# Patient Record
Sex: FEMALE | Race: OTHER | Employment: FULL TIME | URBAN - METROPOLITAN AREA
[De-identification: names, ages, dates, MRNs, and addresses within clinical notes are randomized per-mention and may not be internally consistent; named-entity substitution may affect disease eponyms.]

---

## 2021-02-25 ENCOUNTER — OFFICE VISIT (OUTPATIENT)
Dept: OBGYN CLINIC | Facility: CLINIC | Age: 31
End: 2021-02-25
Payer: COMMERCIAL

## 2021-02-25 VITALS — HEIGHT: 62 IN | BODY MASS INDEX: 38.86 KG/M2 | WEIGHT: 211.19 LBS

## 2021-02-25 DIAGNOSIS — Z01.419 WOMEN'S ANNUAL ROUTINE GYNECOLOGICAL EXAMINATION: Primary | ICD-10-CM

## 2021-02-25 DIAGNOSIS — Z12.4 ROUTINE CERVICAL SMEAR: ICD-10-CM

## 2021-02-25 DIAGNOSIS — Z11.3 SCREENING EXAMINATION FOR VENEREAL DISEASE: ICD-10-CM

## 2021-02-25 DIAGNOSIS — Z30.432 ENCOUNTER FOR REMOVAL OF INTRAUTERINE CONTRACEPTIVE DEVICE: ICD-10-CM

## 2021-02-25 DIAGNOSIS — Z31.69 PRE-CONCEPTION COUNSELING: ICD-10-CM

## 2021-02-25 PROCEDURE — 88175 CYTOPATH C/V AUTO FLUID REDO: CPT | Performed by: OBSTETRICS & GYNECOLOGY

## 2021-02-25 PROCEDURE — 3008F BODY MASS INDEX DOCD: CPT | Performed by: OBSTETRICS & GYNECOLOGY

## 2021-02-25 PROCEDURE — 87106 FUNGI IDENTIFICATION YEAST: CPT | Performed by: OBSTETRICS & GYNECOLOGY

## 2021-02-25 PROCEDURE — 87491 CHLMYD TRACH DNA AMP PROBE: CPT | Performed by: OBSTETRICS & GYNECOLOGY

## 2021-02-25 PROCEDURE — 87205 SMEAR GRAM STAIN: CPT | Performed by: OBSTETRICS & GYNECOLOGY

## 2021-02-25 PROCEDURE — 87591 N.GONORRHOEAE DNA AMP PROB: CPT | Performed by: OBSTETRICS & GYNECOLOGY

## 2021-02-25 PROCEDURE — 58301 REMOVE INTRAUTERINE DEVICE: CPT | Performed by: OBSTETRICS & GYNECOLOGY

## 2021-02-25 PROCEDURE — 99385 PREV VISIT NEW AGE 18-39: CPT | Performed by: OBSTETRICS & GYNECOLOGY

## 2021-02-25 PROCEDURE — 99204 OFFICE O/P NEW MOD 45 MIN: CPT | Performed by: OBSTETRICS & GYNECOLOGY

## 2021-02-25 PROCEDURE — 87808 TRICHOMONAS ASSAY W/OPTIC: CPT | Performed by: OBSTETRICS & GYNECOLOGY

## 2021-02-25 NOTE — PROGRESS NOTES
NEW GYN H&P     2021  8:48 AM    Patient presents with:  New Patient: Annual exam and pap smear.  LMP 2021  Other: family planning   IUD: Paragard IUD removal   .    HPI: Patient is a 35year old  here to establish care - due for annual ex file      ROS:     Review of Systems:  General: no complaints  Eyes: no complaints  Respiratory: no complaints  Cardiovascular: no complaints  GI: denies abdominal pain, diarrhea, constipation  : no complaints, denies dysuria, increased urinary frequency cultures today    2. Pre-conception counseling  - Reviewed timed intercourse and ovulation prediction  - Begin PNV    3.  Encounter for removal of intrauterine contraceptive device  - REMOVE INTRAUTERINE DEVICE      Total time spent = 45 minutes  >50% visit

## 2021-02-26 ENCOUNTER — MED REC SCAN ONLY (OUTPATIENT)
Dept: OBGYN CLINIC | Facility: CLINIC | Age: 31
End: 2021-02-26

## 2021-02-26 LAB
C TRACH DNA SPEC QL NAA+PROBE: NEGATIVE
N GONORRHOEA DNA SPEC QL NAA+PROBE: NEGATIVE

## 2021-02-27 LAB
GENITAL VAGINOSIS SCREEN: NEGATIVE
TRICHOMONAS SCREEN: NEGATIVE

## 2021-02-27 RX ORDER — FLUCONAZOLE 150 MG/1
150 TABLET ORAL DAILY
Qty: 2 TABLET | Refills: 0 | Status: SHIPPED | OUTPATIENT
Start: 2021-02-27 | End: 2021-03-01

## 2021-03-04 NOTE — PROGRESS NOTES
Released to [employer] and results viewed by pt. fluconazole (DIFLUCAN) 150 MG Oral Tab 2 tablet 0 2/27/2021 3/1/2021   Sig - Route: Take 1 tablet (150 mg total) by mouth daily for 2 doses.  Take one dose then repeat in 72 hours - Oral   Sent to pharmacy as:

## 2021-07-08 ENCOUNTER — OFFICE VISIT (OUTPATIENT)
Dept: OBGYN CLINIC | Facility: CLINIC | Age: 31
End: 2021-07-08
Payer: COMMERCIAL

## 2021-07-08 ENCOUNTER — TELEPHONE (OUTPATIENT)
Dept: OBGYN CLINIC | Facility: CLINIC | Age: 31
End: 2021-07-08

## 2021-07-08 VITALS
DIASTOLIC BLOOD PRESSURE: 86 MMHG | BODY MASS INDEX: 39.75 KG/M2 | SYSTOLIC BLOOD PRESSURE: 128 MMHG | WEIGHT: 216 LBS | HEIGHT: 62 IN

## 2021-07-08 DIAGNOSIS — Z32.00 ENCOUNTER FOR CONFIRMATION OF PREGNANCY TEST RESULT WITH PHYSICAL EXAMINATION: Primary | ICD-10-CM

## 2021-07-08 PROBLEM — Z31.69 PRE-CONCEPTION COUNSELING: Status: RESOLVED | Noted: 2021-02-25 | Resolved: 2021-07-08

## 2021-07-08 PROBLEM — E66.9 OBESITY: Status: ACTIVE | Noted: 2018-07-23

## 2021-07-08 PROBLEM — Z01.419 WOMEN'S ANNUAL ROUTINE GYNECOLOGICAL EXAMINATION: Status: RESOLVED | Noted: 2021-02-25 | Resolved: 2021-07-08

## 2021-07-08 PROBLEM — Z30.432 ENCOUNTER FOR REMOVAL OF INTRAUTERINE CONTRACEPTIVE DEVICE: Status: RESOLVED | Noted: 2021-02-25 | Resolved: 2021-07-08

## 2021-07-08 LAB
CONTROL LINE PRESENT WITH A CLEAR BACKGROUND (YES/NO): YES YES/NO
KIT EXPIRATION DATE: NORMAL DATE
PREGNANCY TEST, URINE: POSITIVE

## 2021-07-08 PROCEDURE — 3008F BODY MASS INDEX DOCD: CPT | Performed by: OBSTETRICS & GYNECOLOGY

## 2021-07-08 PROCEDURE — 3079F DIAST BP 80-89 MM HG: CPT | Performed by: OBSTETRICS & GYNECOLOGY

## 2021-07-08 PROCEDURE — 99215 OFFICE O/P EST HI 40 MIN: CPT | Performed by: OBSTETRICS & GYNECOLOGY

## 2021-07-08 PROCEDURE — 81025 URINE PREGNANCY TEST: CPT | Performed by: OBSTETRICS & GYNECOLOGY

## 2021-07-08 PROCEDURE — 3074F SYST BP LT 130 MM HG: CPT | Performed by: OBSTETRICS & GYNECOLOGY

## 2021-07-08 NOTE — PROGRESS NOTES
OFFICE VISIT FOR CONFIRMATION OF PREGNANCY    2021  6:18 PM    CC: Patient is here for confirmation of pregnancy. EGA 5 W    HPI: Patient is a 28year old  Patient's last menstrual period was 2021.  Unknown Estimated Date of Delivery: None 2018    Other Topics      Concerns:         Service: Not Asked        Blood Transfusions: No        Caffeine Concern: Not Asked        Occupational Exposure: Not Asked        Hobby Hazards: Not Asked        Sleep Concern: Not Asked        Stress Co balanced diet with emphasis on iron rich and food high in calcium. I also recommended calcium and vitamin D supplementation, as well as DHA/omega3 fatty acids.   2.) Appropriate weight gain in pregnancy (20 - 30 lbs if normal weight, and 10 - 15 lbs if over

## 2021-07-08 NOTE — TELEPHONE ENCOUNTER
Pt has an appt for August 9th for confirmation of pregnancy but would like to see about getting in sooner.

## 2021-07-08 NOTE — TELEPHONE ENCOUNTER
Informed PSR Francia of appointment slot with [facility] available today, to call pt and provide that appointment and done.

## 2021-08-02 ENCOUNTER — TELEPHONE (OUTPATIENT)
Dept: OBGYN CLINIC | Facility: CLINIC | Age: 31
End: 2021-08-02

## 2021-08-02 ENCOUNTER — ULTRASOUND ENCOUNTER (OUTPATIENT)
Dept: OBGYN CLINIC | Facility: CLINIC | Age: 31
End: 2021-08-02
Payer: COMMERCIAL

## 2021-08-02 DIAGNOSIS — Z32.00 ENCOUNTER FOR CONFIRMATION OF PREGNANCY TEST RESULT WITH PHYSICAL EXAMINATION: ICD-10-CM

## 2021-08-02 PROCEDURE — 76817 TRANSVAGINAL US OBSTETRIC: CPT | Performed by: OBSTETRICS & GYNECOLOGY

## 2021-08-02 NOTE — TELEPHONE ENCOUNTER
Patient seen at [facility] on Sunday with spotting and cramping. Patient has continued to spot and was told to call her physician to be seen.

## 2021-08-02 NOTE — TELEPHONE ENCOUNTER
Pt contacted. Pt reports still spotting bright red blood. Pt states [facility] ED did HCG level, not available through care everywhere, pt read hcg lab value over phone \" [phone]\".  Pt states she had an ultrasound in the ed yesterday as well an Intrauterine p

## 2021-08-04 ENCOUNTER — TELEPHONE (OUTPATIENT)
Dept: OBGYN CLINIC | Facility: CLINIC | Age: 31
End: 2021-08-04

## 2021-08-04 DIAGNOSIS — O41.8X10 SUBCHORIONIC HEMATOMA IN FIRST TRIMESTER, SINGLE OR UNSPECIFIED FETUS: Primary | ICD-10-CM

## 2021-08-04 DIAGNOSIS — O46.8X1 SUBCHORIONIC HEMATOMA IN FIRST TRIMESTER, SINGLE OR UNSPECIFIED FETUS: Primary | ICD-10-CM

## 2021-08-04 NOTE — TELEPHONE ENCOUNTER
RN reviewed ultrasound with EB. EB okay with pt scheduling RN OB edu visit, NOB, repeat ultrasound in 4 weeks. RN contacted pt, informed her of POC. Pt verbalized understanding, agrees.      Pt scheduled for:    RN OB Edu 8/10/21  NOB: 9/1/21 with GEOFF day

## 2021-08-04 NOTE — TELEPHONE ENCOUNTER
RN contacted pt. RN informed pt that ultrasound report has been printed and will discuss with EB when she arrives. Pt reports still spotting bright red to brown, pt states it is every time she wipes. Pt denies pain, reports occasional light cramping.

## 2021-08-10 ENCOUNTER — NURSE ONLY (OUTPATIENT)
Dept: OBGYN CLINIC | Facility: CLINIC | Age: 31
End: 2021-08-10
Payer: COMMERCIAL

## 2021-08-10 DIAGNOSIS — Z34.81 ENCOUNTER FOR SUPERVISION OF OTHER NORMAL PREGNANCY IN FIRST TRIMESTER: Primary | ICD-10-CM

## 2021-08-10 DIAGNOSIS — E66.9 OBESITY (BMI 30-39.9): ICD-10-CM

## 2021-08-10 RX ORDER — PRENATAL VIT/IRON FUM/FOLIC AC 27MG-0.8MG
1 TABLET ORAL DAILY
COMMUNITY

## 2021-08-10 NOTE — PROGRESS NOTES
Pt on phone today for RN [facility] Education.     Pregnancy confirmation visit with [facility], primary Dr. Tamra Altamirano  LMP: 21    Pre  BMI:  39.31  EPDS score: 1  +UPT in office: 21    US:21 8 weeks  Working CAYDEN:2022  Hx of genetic abnormality

## 2021-08-16 ENCOUNTER — LAB ENCOUNTER (OUTPATIENT)
Dept: LAB | Age: 31
End: 2021-08-16
Attending: OBSTETRICS & GYNECOLOGY
Payer: COMMERCIAL

## 2021-08-16 ENCOUNTER — PATIENT MESSAGE (OUTPATIENT)
Dept: OBGYN CLINIC | Facility: CLINIC | Age: 31
End: 2021-08-16

## 2021-08-16 ENCOUNTER — TELEPHONE (OUTPATIENT)
Dept: OBGYN CLINIC | Facility: CLINIC | Age: 31
End: 2021-08-16

## 2021-08-16 DIAGNOSIS — E66.9 OBESITY (BMI 30-39.9): ICD-10-CM

## 2021-08-16 DIAGNOSIS — Z34.81 ENCOUNTER FOR SUPERVISION OF OTHER NORMAL PREGNANCY IN FIRST TRIMESTER: ICD-10-CM

## 2021-08-16 DIAGNOSIS — O24.419 GESTATIONAL DIABETES MELLITUS (GDM) IN SECOND TRIMESTER, GESTATIONAL DIABETES METHOD OF CONTROL UNSPECIFIED: Primary | ICD-10-CM

## 2021-08-16 LAB
ANTIBODY SCREEN: NEGATIVE
BASOPHILS # BLD AUTO: 0.04 X10(3) UL (ref 0–0.2)
BASOPHILS NFR BLD AUTO: 0.4 %
DEPRECATED RDW RBC AUTO: 41.1 FL (ref 35.1–46.3)
EOSINOPHIL # BLD AUTO: 0.13 X10(3) UL (ref 0–0.7)
EOSINOPHIL NFR BLD AUTO: 1.3 %
ERYTHROCYTE [DISTWIDTH] IN BLOOD BY AUTOMATED COUNT: 13.1 % (ref 11–15)
GLUCOSE 1H P GLC SERPL-MCNC: 144 MG/DL
HBV SURFACE AG SER-ACNC: 0.22 [IU]/L
HBV SURFACE AG SERPL QL IA: NONREACTIVE
HCT VFR BLD AUTO: 37.3 %
HGB BLD-MCNC: 12.3 G/DL
IMM GRANULOCYTES # BLD AUTO: 0.04 X10(3) UL (ref 0–1)
IMM GRANULOCYTES NFR BLD: 0.4 %
LYMPHOCYTES # BLD AUTO: 2.16 X10(3) UL (ref 1–4)
LYMPHOCYTES NFR BLD AUTO: 20.9 %
MCH RBC QN AUTO: 28.5 PG (ref 26–34)
MCHC RBC AUTO-ENTMCNC: 33 G/DL (ref 31–37)
MCV RBC AUTO: 86.3 FL
MONOCYTES # BLD AUTO: 0.43 X10(3) UL (ref 0.1–1)
MONOCYTES NFR BLD AUTO: 4.2 %
NEUTROPHILS # BLD AUTO: 7.52 X10 (3) UL (ref 1.5–7.7)
NEUTROPHILS # BLD AUTO: 7.52 X10(3) UL (ref 1.5–7.7)
NEUTROPHILS NFR BLD AUTO: 72.8 %
PLATELET # BLD AUTO: 355 10(3)UL (ref 150–450)
RBC # BLD AUTO: 4.32 X10(6)UL
RH BLOOD TYPE: POSITIVE
RUBV IGG SER QL: POSITIVE
RUBV IGG SER-ACNC: 82 IU/ML (ref 10–?)
TSI SER-ACNC: 1.79 MIU/ML (ref 0.36–3.74)
WBC # BLD AUTO: 10.3 X10(3) UL (ref 4–11)

## 2021-08-16 PROCEDURE — 87086 URINE CULTURE/COLONY COUNT: CPT

## 2021-08-16 PROCEDURE — 86762 RUBELLA ANTIBODY: CPT

## 2021-08-16 PROCEDURE — 82950 GLUCOSE TEST: CPT

## 2021-08-16 PROCEDURE — 86780 TREPONEMA PALLIDUM: CPT

## 2021-08-16 PROCEDURE — 86900 BLOOD TYPING SEROLOGIC ABO: CPT

## 2021-08-16 PROCEDURE — 85025 COMPLETE CBC W/AUTO DIFF WBC: CPT

## 2021-08-16 PROCEDURE — 86850 RBC ANTIBODY SCREEN: CPT

## 2021-08-16 PROCEDURE — 87340 HEPATITIS B SURFACE AG IA: CPT

## 2021-08-16 PROCEDURE — 86901 BLOOD TYPING SEROLOGIC RH(D): CPT

## 2021-08-16 PROCEDURE — 36415 COLL VENOUS BLD VENIPUNCTURE: CPT

## 2021-08-16 PROCEDURE — 84443 ASSAY THYROID STIM HORMONE: CPT

## 2021-08-16 PROCEDURE — 87077 CULTURE AEROBIC IDENTIFY: CPT

## 2021-08-16 PROCEDURE — 87389 HIV-1 AG W/HIV-1&-2 AB AG IA: CPT

## 2021-08-16 NOTE — TELEPHONE ENCOUNTER
Please see 1st note which states, pt informed of diabetic referral with diabetic educators. Called pt and provided information on GDM, diet control. Pt verbalized understanding and agrees with POC.

## 2021-08-16 NOTE — TELEPHONE ENCOUNTER
Sachi Sebastian MD   8/16/2021  2:01 PM CDT      Your diabetes screen is positive. Please schedule an appointment with one of our diabetic educators.     The referral for diabetic education has been placed.   Someone should be contacting you soon

## 2021-08-17 NOTE — PROGRESS NOTES
Noe Piper RN    AD    8/16/21 3:23 PM  Note      Called pt and provided information on GDM, diet control. Pt verbalized understanding and agrees with POC.

## 2021-08-18 LAB — T PALLIDUM AB SER QL: NEGATIVE

## 2021-08-19 ENCOUNTER — TELEPHONE (OUTPATIENT)
Dept: OBGYN CLINIC | Facility: CLINIC | Age: 31
End: 2021-08-19

## 2021-08-19 RX ORDER — AMOXICILLIN 500 MG/1
500 CAPSULE ORAL 3 TIMES DAILY
Qty: 9 CAPSULE | Refills: 0 | Status: SHIPPED | OUTPATIENT
Start: 2021-08-19 | End: 2021-08-22

## 2021-08-19 NOTE — TELEPHONE ENCOUNTER
----- Message from Donald Arellano MD sent at 8/18/2021  1:06 PM CDT -----  Your lab results show UTI.   Please take Amoxicillin 500 mg three times daily x 3d      Es Munoz MD    Spoke with pt, results of urine culture given and explained need for

## 2021-08-19 NOTE — PROGRESS NOTES
Spoke with pt, results of urine culture given and explained need for treatment with above meds. Pt states NKDA and pharmacy verified. [address] sent to pharmacy.

## 2021-08-27 ENCOUNTER — TELEPHONE (OUTPATIENT)
Dept: OBGYN CLINIC | Facility: CLINIC | Age: 31
End: 2021-08-27

## 2021-08-27 NOTE — TELEPHONE ENCOUNTER
Per Dr. Destiny Coley ok to call pt with Negative prequel/female fetus results.     LM on pt's voicemail to call back for results. [employer] message sent to pt.

## 2021-08-27 NOTE — TELEPHONE ENCOUNTER
Pt called back, 2 patient identifiers verified by patient. Spoke to pt normal results given/explained pt voiced understanding. Fetal sex results NOT given to pt. Pt would like to  gender results at next office visit with Dr. Cathy Clark.

## 2021-08-28 ENCOUNTER — APPOINTMENT (OUTPATIENT)
Dept: ENDOCRINOLOGY | Facility: HOSPITAL | Age: 31
End: 2021-08-28
Attending: OBSTETRICS & GYNECOLOGY
Payer: COMMERCIAL

## 2021-09-01 ENCOUNTER — INITIAL PRENATAL (OUTPATIENT)
Dept: OBGYN CLINIC | Facility: CLINIC | Age: 31
End: 2021-09-01
Payer: COMMERCIAL

## 2021-09-01 ENCOUNTER — ULTRASOUND ENCOUNTER (OUTPATIENT)
Dept: OBGYN CLINIC | Facility: CLINIC | Age: 31
End: 2021-09-01
Payer: COMMERCIAL

## 2021-09-01 VITALS
SYSTOLIC BLOOD PRESSURE: 118 MMHG | BODY MASS INDEX: 40.12 KG/M2 | DIASTOLIC BLOOD PRESSURE: 74 MMHG | WEIGHT: 218 LBS | HEIGHT: 62 IN

## 2021-09-01 DIAGNOSIS — O46.8X1 SUBCHORIONIC HEMATOMA IN FIRST TRIMESTER, SINGLE OR UNSPECIFIED FETUS: ICD-10-CM

## 2021-09-01 DIAGNOSIS — O41.8X10 SUBCHORIONIC HEMATOMA IN FIRST TRIMESTER, SINGLE OR UNSPECIFIED FETUS: ICD-10-CM

## 2021-09-01 DIAGNOSIS — Z34.81 MULTIGRAVIDA IN FIRST TRIMESTER: Primary | ICD-10-CM

## 2021-09-01 DIAGNOSIS — O24.319 MATERNAL PREGESTATIONAL DIABETES CLASSES B THROUGH R, ANTEPARTUM: ICD-10-CM

## 2021-09-01 PROCEDURE — 3078F DIAST BP <80 MM HG: CPT | Performed by: OBSTETRICS & GYNECOLOGY

## 2021-09-01 PROCEDURE — 3074F SYST BP LT 130 MM HG: CPT | Performed by: OBSTETRICS & GYNECOLOGY

## 2021-09-01 PROCEDURE — 3008F BODY MASS INDEX DOCD: CPT | Performed by: OBSTETRICS & GYNECOLOGY

## 2021-09-01 PROCEDURE — 76817 TRANSVAGINAL US OBSTETRIC: CPT | Performed by: OBSTETRICS & GYNECOLOGY

## 2021-09-01 RX ORDER — LORATADINE 10 MG/1
10 TABLET ORAL DAILY
COMMUNITY
End: 2021-12-31

## 2021-09-01 NOTE — PROGRESS NOTES
Here for NOB visit after USN today confirms SIUP at 13.1w with CAYDEN 3/8/2022 and small [facility] without any further bleeding. Aware of normal CFDNA results, gender in envelope today.  Has GDM teaching appointment next week -- currently controlling sugar and porti

## 2021-09-08 ENCOUNTER — APPOINTMENT (OUTPATIENT)
Dept: ENDOCRINOLOGY | Facility: HOSPITAL | Age: 31
End: 2021-09-08
Attending: OBSTETRICS & GYNECOLOGY
Payer: COMMERCIAL

## 2021-09-13 ENCOUNTER — TELEPHONE (OUTPATIENT)
Dept: OBGYN CLINIC | Facility: CLINIC | Age: 31
End: 2021-09-13

## 2021-09-13 NOTE — TELEPHONE ENCOUNTER
RN contacted pt. Pt reports visiting ED 9/11/21 for vaginal bleeding and reports passing golf-ball size clot. Pt states that ultrasound in ED showed a low-lying placenta.  Pt denies any further vaginal bleeding, states cramping has improved greatly since ED

## 2021-09-14 ENCOUNTER — ROUTINE PRENATAL (OUTPATIENT)
Dept: OBGYN CLINIC | Facility: CLINIC | Age: 31
End: 2021-09-14
Payer: COMMERCIAL

## 2021-09-14 VITALS
DIASTOLIC BLOOD PRESSURE: 60 MMHG | BODY MASS INDEX: 40.03 KG/M2 | HEIGHT: 62 IN | WEIGHT: 217.5 LBS | SYSTOLIC BLOOD PRESSURE: 118 MMHG

## 2021-09-14 DIAGNOSIS — Z36.9 UNSPECIFIED ANTENATAL SCREENING: Primary | ICD-10-CM

## 2021-09-14 DIAGNOSIS — O44.42 LOW-LYING PLACENTA IN SECOND TRIMESTER: ICD-10-CM

## 2021-09-14 DIAGNOSIS — O46.8X2 SUBCHORIONIC HEMATOMA IN SECOND TRIMESTER, SINGLE OR UNSPECIFIED FETUS: ICD-10-CM

## 2021-09-14 DIAGNOSIS — O46.92 SECOND TRIMESTER BLEEDING: ICD-10-CM

## 2021-09-14 DIAGNOSIS — O41.8X20 SUBCHORIONIC HEMATOMA IN SECOND TRIMESTER, SINGLE OR UNSPECIFIED FETUS: ICD-10-CM

## 2021-09-14 LAB
GLUCOSE (URINE DIPSTICK): NEGATIVE MG/DL
MULTISTIX LOT#: NORMAL NUMERIC
PROTEIN (URINE DIPSTICK): NEGATIVE MG/DL

## 2021-09-14 PROCEDURE — 3008F BODY MASS INDEX DOCD: CPT | Performed by: OBSTETRICS & GYNECOLOGY

## 2021-09-14 PROCEDURE — 3074F SYST BP LT 130 MM HG: CPT | Performed by: OBSTETRICS & GYNECOLOGY

## 2021-09-14 PROCEDURE — 3078F DIAST BP <80 MM HG: CPT | Performed by: OBSTETRICS & GYNECOLOGY

## 2021-09-14 PROCEDURE — 81002 URINALYSIS NONAUTO W/O SCOPE: CPT | Performed by: OBSTETRICS & GYNECOLOGY

## 2021-09-14 NOTE — PROGRESS NOTES
Seen 9/11/21 in [address] [place] [address] ER for cramping and bleeding with USN showing low-lying vs marginal placenta previa. Reports no further bleeding until light spotting this morning on way in to appointment although no cramps.  Prior USN here 09/1/21 at 13w showed small

## 2021-09-16 ENCOUNTER — ULTRASOUND ENCOUNTER (OUTPATIENT)
Dept: OBGYN CLINIC | Facility: CLINIC | Age: 31
End: 2021-09-16
Payer: COMMERCIAL

## 2021-09-16 DIAGNOSIS — O26.899 CRAMPING AFFECTING PREGNANCY, ANTEPARTUM: ICD-10-CM

## 2021-09-16 DIAGNOSIS — R10.9 CRAMPING AFFECTING PREGNANCY, ANTEPARTUM: ICD-10-CM

## 2021-09-16 DIAGNOSIS — O46.90 VAGINAL BLEEDING DURING PREGNANCY: Primary | ICD-10-CM

## 2021-09-16 DIAGNOSIS — O46.90 VAGINAL BLEEDING DURING PREGNANCY: ICD-10-CM

## 2021-09-16 PROCEDURE — 76815 OB US LIMITED FETUS(S): CPT | Performed by: OBSTETRICS & GYNECOLOGY

## 2021-09-18 ENCOUNTER — HOSPITAL ENCOUNTER (OUTPATIENT)
Dept: ENDOCRINOLOGY | Facility: HOSPITAL | Age: 31
Discharge: HOME | End: 2021-09-18
Attending: OBSTETRICS & GYNECOLOGY
Payer: COMMERCIAL

## 2021-09-18 DIAGNOSIS — O24.419 GESTATIONAL DIABETES MELLITUS (GDM) IN SECOND TRIMESTER, GESTATIONAL DIABETES METHOD OF CONTROL UNSPECIFIED: Primary | ICD-10-CM

## 2021-09-18 DIAGNOSIS — O24.410 DIET CONTROLLED GESTATIONAL DIABETES MELLITUS (GDM) IN FIRST TRIMESTER: Primary | ICD-10-CM

## 2021-09-18 RX ORDER — LANCETS
EACH MISCELLANEOUS
Qty: 100 EACH | Refills: 5 | Status: SHIPPED | OUTPATIENT
Start: 2021-09-18 | End: 2021-11-02

## 2021-09-18 RX ORDER — BLOOD-GLUCOSE METER
1 EACH MISCELLANEOUS DAILY
Qty: 1 KIT | Refills: 0 | Status: SHIPPED | OUTPATIENT
Start: 2021-09-18 | End: 2022-09-18

## 2021-09-18 RX ORDER — BLOOD SUGAR DIAGNOSTIC
1 STRIP MISCELLANEOUS 4 TIMES DAILY
Qty: 100 STRIP | Refills: 5 | Status: SHIPPED | OUTPATIENT
Start: 2021-09-18 | End: 2022-01-16

## 2021-09-20 ENCOUNTER — TELEPHONE (OUTPATIENT)
Dept: OBGYN CLINIC | Facility: CLINIC | Age: 31
End: 2021-09-20

## 2021-09-20 NOTE — TELEPHONE ENCOUNTER
Pt wanting to know results of ultrasound done on 9/16/21 for f/u of [address]. Name: Lashell Pelletier Patient ID: [id number]    Comment  Please note normal fetal growth, see above--No gross abnormalities are visualized, that is, within limitations of scan  9-16+ weeks' gestation. A 1.6cm x 1.0cm subchorionic bleed like area is again noted adjacent to the gestational sac (was 1.4cm x 0.4cm). RN to route to EB.

## 2021-09-21 ENCOUNTER — APPOINTMENT (OUTPATIENT)
Dept: ENDOCRINOLOGY | Facility: HOSPITAL | Age: 31
End: 2021-09-21
Attending: OBSTETRICS & GYNECOLOGY
Payer: COMMERCIAL

## 2021-09-22 ENCOUNTER — TELEPHONE (OUTPATIENT)
Dept: OBGYN CLINIC | Facility: CLINIC | Age: 31
End: 2021-09-22

## 2021-09-22 NOTE — TELEPHONE ENCOUNTER
LM on VM that USN showed minimal change in size of  [facility]. To track any spotting and to keep scheduled ANDRIA appointment with me.

## 2021-09-27 ENCOUNTER — TELEPHONE (OUTPATIENT)
Dept: OBGYN CLINIC | Facility: CLINIC | Age: 31
End: 2021-09-27

## 2021-09-27 DIAGNOSIS — O24.419 GESTATIONAL DIABETES MELLITUS (GDM) IN SECOND TRIMESTER, GESTATIONAL DIABETES METHOD OF CONTROL UNSPECIFIED: Primary | ICD-10-CM

## 2021-09-27 RX ORDER — LANCETS
EACH MISCELLANEOUS
Qty: 200 EACH | Refills: 3 | Status: SHIPPED | OUTPATIENT
Start: 2021-09-27

## 2021-09-27 RX ORDER — BLOOD SUGAR DIAGNOSTIC
STRIP MISCELLANEOUS
Qty: 300 STRIP | Refills: 1 | Status: SHIPPED | OUTPATIENT
Start: 2021-09-27 | End: 2021-11-02

## 2021-09-27 RX ORDER — BLOOD-GLUCOSE METER
EACH MISCELLANEOUS
Qty: 1 KIT | Refills: 0 | Status: SHIPPED | OUTPATIENT
Start: 2021-09-27 | End: 2021-11-02

## 2021-09-27 NOTE — TELEPHONE ENCOUNTER
RN provided info of pt's denial from [facility] for blood glucose monitoring. RN spoke to pharmacy who informed this RN that blood glucose kit that is covered for pt is the accu chck guide. RN sent new prescriptions and notified pt.          Pt agrees

## 2021-09-30 ENCOUNTER — ROUTINE PRENATAL (OUTPATIENT)
Dept: OBGYN CLINIC | Facility: CLINIC | Age: 31
End: 2021-09-30
Payer: COMMERCIAL

## 2021-09-30 ENCOUNTER — LABORATORY ENCOUNTER (OUTPATIENT)
Dept: LAB | Facility: REFERENCE LAB | Age: 31
End: 2021-09-30
Attending: OBSTETRICS & GYNECOLOGY
Payer: COMMERCIAL

## 2021-09-30 ENCOUNTER — MED REC SCAN ONLY (OUTPATIENT)
Dept: OBGYN CLINIC | Facility: CLINIC | Age: 31
End: 2021-09-30

## 2021-09-30 VITALS
SYSTOLIC BLOOD PRESSURE: 122 MMHG | BODY MASS INDEX: 39.93 KG/M2 | HEIGHT: 62 IN | WEIGHT: 217 LBS | DIASTOLIC BLOOD PRESSURE: 76 MMHG

## 2021-09-30 DIAGNOSIS — O41.8X20 SUBCHORIONIC HEMATOMA IN SECOND TRIMESTER, SINGLE OR UNSPECIFIED FETUS: ICD-10-CM

## 2021-09-30 DIAGNOSIS — Z36.9 UNSPECIFIED ANTENATAL SCREENING: ICD-10-CM

## 2021-09-30 DIAGNOSIS — Z34.82 MULTIGRAVIDA IN SECOND TRIMESTER: ICD-10-CM

## 2021-09-30 DIAGNOSIS — Z36.9 UNSPECIFIED ANTENATAL SCREENING: Primary | ICD-10-CM

## 2021-09-30 DIAGNOSIS — O46.8X2 SUBCHORIONIC HEMATOMA IN SECOND TRIMESTER, SINGLE OR UNSPECIFIED FETUS: ICD-10-CM

## 2021-09-30 DIAGNOSIS — O24.410 DIET CONTROLLED GESTATIONAL DIABETES MELLITUS (GDM) IN FIRST TRIMESTER: ICD-10-CM

## 2021-09-30 PROBLEM — O24.319 MATERNAL PREGESTATIONAL DIABETES CLASSES B THROUGH R, ANTEPARTUM (HCC): Status: RESOLVED | Noted: 2021-09-01 | Resolved: 2021-09-30

## 2021-09-30 PROBLEM — O44.42 LOW-LYING PLACENTA IN SECOND TRIMESTER: Status: RESOLVED | Noted: 2021-09-14 | Resolved: 2021-09-30

## 2021-09-30 PROBLEM — O24.319 MATERNAL PREGESTATIONAL DIABETES CLASSES B THROUGH R, ANTEPARTUM: Status: RESOLVED | Noted: 2021-09-01 | Resolved: 2021-09-30

## 2021-09-30 PROBLEM — O44.42 LOW-LYING PLACENTA IN SECOND TRIMESTER (HCC): Status: RESOLVED | Noted: 2021-09-14 | Resolved: 2021-09-30

## 2021-09-30 LAB
GLUCOSE (URINE DIPSTICK): NEGATIVE MG/DL
MULTISTIX EXPIRATION DATE: NORMAL DATE
MULTISTIX LOT#: NORMAL NUMERIC
PROTEIN (URINE DIPSTICK): NEGATIVE MG/DL

## 2021-09-30 PROCEDURE — 3078F DIAST BP <80 MM HG: CPT | Performed by: OBSTETRICS & GYNECOLOGY

## 2021-09-30 PROCEDURE — 36415 COLL VENOUS BLD VENIPUNCTURE: CPT

## 2021-09-30 PROCEDURE — 81002 URINALYSIS NONAUTO W/O SCOPE: CPT | Performed by: OBSTETRICS & GYNECOLOGY

## 2021-09-30 PROCEDURE — 82105 ALPHA-FETOPROTEIN SERUM: CPT

## 2021-09-30 PROCEDURE — 3074F SYST BP LT 130 MM HG: CPT | Performed by: OBSTETRICS & GYNECOLOGY

## 2021-09-30 PROCEDURE — 3008F BODY MASS INDEX DOCD: CPT | Performed by: OBSTETRICS & GYNECOLOGY

## 2021-09-30 NOTE — PROGRESS NOTES
Seen for GDM diet and teaching 9/18/21 - still awaiting supplies approval from insurance, should be able to begin Accuchecks next week. Stressed compliance with diet. For AFP today and 20w USN next visit. Plans on COVID vaccine this week as well.

## 2021-10-16 ENCOUNTER — APPOINTMENT (OUTPATIENT)
Dept: ENDOCRINOLOGY | Facility: HOSPITAL | Age: 31
End: 2021-10-16
Attending: OBSTETRICS & GYNECOLOGY
Payer: COMMERCIAL

## 2021-10-27 ENCOUNTER — ULTRASOUND ENCOUNTER (OUTPATIENT)
Dept: OBGYN CLINIC | Facility: CLINIC | Age: 31
End: 2021-10-27
Payer: COMMERCIAL

## 2021-10-27 DIAGNOSIS — O46.8X2 SUBCHORIONIC HEMATOMA IN SECOND TRIMESTER, SINGLE OR UNSPECIFIED FETUS: ICD-10-CM

## 2021-10-27 DIAGNOSIS — O41.8X20 SUBCHORIONIC HEMATOMA IN SECOND TRIMESTER, SINGLE OR UNSPECIFIED FETUS: ICD-10-CM

## 2021-10-27 DIAGNOSIS — Z34.82 MULTIGRAVIDA IN SECOND TRIMESTER: ICD-10-CM

## 2021-10-27 DIAGNOSIS — O24.410 DIET CONTROLLED GESTATIONAL DIABETES MELLITUS (GDM) IN FIRST TRIMESTER: ICD-10-CM

## 2021-10-27 PROCEDURE — 76805 OB US >/= 14 WKS SNGL FETUS: CPT | Performed by: OBSTETRICS & GYNECOLOGY

## 2021-10-27 PROCEDURE — 76817 TRANSVAGINAL US OBSTETRIC: CPT | Performed by: OBSTETRICS & GYNECOLOGY

## 2021-10-28 ENCOUNTER — TELEPHONE (OUTPATIENT)
Dept: OBGYN CLINIC | Facility: CLINIC | Age: 31
End: 2021-10-28

## 2021-10-28 NOTE — TELEPHONE ENCOUNTER
Called pt and c/o congestion, ear ache, advise d/t pregnancy and use Vicks, NETTLE pot, Claritin, Nasal Spray, and/or Theraflu cold. Advise NO medications with pseudoephedrine and monitor Tylenol intake. Pt verbalized understanding and agrees with POC.
The patient is 5 months pregnant and is coming down with a cold and wanted to know what she can take not to harm the baby.
declines

## 2021-11-02 ENCOUNTER — ROUTINE PRENATAL (OUTPATIENT)
Dept: OBGYN CLINIC | Facility: CLINIC | Age: 31
End: 2021-11-02
Payer: COMMERCIAL

## 2021-11-02 VITALS
DIASTOLIC BLOOD PRESSURE: 70 MMHG | SYSTOLIC BLOOD PRESSURE: 112 MMHG | HEIGHT: 62 IN | BODY MASS INDEX: 40.67 KG/M2 | WEIGHT: 221 LBS

## 2021-11-02 DIAGNOSIS — Z34.82 MULTIGRAVIDA IN SECOND TRIMESTER: Primary | ICD-10-CM

## 2021-11-02 PROBLEM — O99.810 ABNORMAL MATERNAL GLUCOSE TOLERANCE, ANTEPARTUM: Chronic | Status: ACTIVE | Noted: 2021-11-02

## 2021-11-02 PROBLEM — O24.410 DIET CONTROLLED GESTATIONAL DIABETES MELLITUS (GDM) IN FIRST TRIMESTER (HCC): Chronic | Status: ACTIVE | Noted: 2021-09-18

## 2021-11-02 PROBLEM — O99.810 ABNORMAL MATERNAL GLUCOSE TOLERANCE, ANTEPARTUM: Status: ACTIVE | Noted: 2021-11-02

## 2021-11-02 PROBLEM — O99.810 ABNORMAL MATERNAL GLUCOSE TOLERANCE, ANTEPARTUM (HCC): Chronic | Status: ACTIVE | Noted: 2021-11-02

## 2021-11-02 PROBLEM — O44.20 MARGINAL PLACENTA PREVIA (HCC): Status: ACTIVE | Noted: 2021-11-02

## 2021-11-02 PROBLEM — O99.810 ABNORMAL MATERNAL GLUCOSE TOLERANCE, ANTEPARTUM (HCC): Status: ACTIVE | Noted: 2021-11-02

## 2021-11-02 PROBLEM — O24.410 DIET CONTROLLED GESTATIONAL DIABETES MELLITUS (GDM) IN FIRST TRIMESTER: Chronic | Status: ACTIVE | Noted: 2021-09-18

## 2021-11-02 PROBLEM — O44.20 MARGINAL PLACENTA PREVIA: Status: ACTIVE | Noted: 2021-11-02

## 2021-11-02 PROBLEM — O44.20 MARGINAL PLACENTA PREVIA: Chronic | Status: ACTIVE | Noted: 2021-11-02

## 2021-11-02 PROBLEM — O46.92 SECOND TRIMESTER BLEEDING (HCC): Status: RESOLVED | Noted: 2021-09-14 | Resolved: 2021-11-02

## 2021-11-02 PROBLEM — O41.8X20 SUBCHORIONIC HEMATOMA IN SECOND TRIMESTER: Status: RESOLVED | Noted: 2021-09-14 | Resolved: 2021-11-02

## 2021-11-02 PROBLEM — O46.8X2 SUBCHORIONIC HEMATOMA IN SECOND TRIMESTER: Status: RESOLVED | Noted: 2021-09-14 | Resolved: 2021-11-02

## 2021-11-02 PROBLEM — O44.20 MARGINAL PLACENTA PREVIA (HCC): Chronic | Status: ACTIVE | Noted: 2021-11-02

## 2021-11-02 PROBLEM — O46.8X2 SUBCHORIONIC HEMATOMA IN SECOND TRIMESTER (HCC): Status: RESOLVED | Noted: 2021-09-14 | Resolved: 2021-11-02

## 2021-11-02 PROBLEM — O41.8X20 SUBCHORIONIC HEMATOMA IN SECOND TRIMESTER (HCC): Status: RESOLVED | Noted: 2021-09-14 | Resolved: 2021-11-02

## 2021-11-02 PROBLEM — O46.92 SECOND TRIMESTER BLEEDING: Status: RESOLVED | Noted: 2021-09-14 | Resolved: 2021-11-02

## 2021-11-02 PROCEDURE — 3008F BODY MASS INDEX DOCD: CPT | Performed by: OBSTETRICS & GYNECOLOGY

## 2021-11-02 PROCEDURE — 3074F SYST BP LT 130 MM HG: CPT | Performed by: OBSTETRICS & GYNECOLOGY

## 2021-11-02 PROCEDURE — 3078F DIAST BP <80 MM HG: CPT | Performed by: OBSTETRICS & GYNECOLOGY

## 2021-11-02 PROCEDURE — 81002 URINALYSIS NONAUTO W/O SCOPE: CPT | Performed by: OBSTETRICS & GYNECOLOGY

## 2021-11-02 NOTE — PROGRESS NOTES
Patient is aware of USN showing marginal placenta previa 1.7 cm from os. Plans FU usn 28 W. She failed 1 hour GTT and is doing accuchecks - all normal. FBS less than 95, 2 hour PP less than 120.

## 2021-12-01 ENCOUNTER — ROUTINE PRENATAL (OUTPATIENT)
Dept: OBGYN CLINIC | Facility: CLINIC | Age: 31
End: 2021-12-01
Payer: COMMERCIAL

## 2021-12-01 VITALS
BODY MASS INDEX: 41.04 KG/M2 | SYSTOLIC BLOOD PRESSURE: 120 MMHG | DIASTOLIC BLOOD PRESSURE: 70 MMHG | HEIGHT: 62 IN | WEIGHT: 223 LBS

## 2021-12-01 DIAGNOSIS — Z36.9 UNSPECIFIED ANTENATAL SCREENING: Primary | ICD-10-CM

## 2021-12-01 PROCEDURE — 81002 URINALYSIS NONAUTO W/O SCOPE: CPT | Performed by: OBSTETRICS & GYNECOLOGY

## 2021-12-01 PROCEDURE — 3008F BODY MASS INDEX DOCD: CPT | Performed by: OBSTETRICS & GYNECOLOGY

## 2021-12-01 PROCEDURE — 3078F DIAST BP <80 MM HG: CPT | Performed by: OBSTETRICS & GYNECOLOGY

## 2021-12-01 PROCEDURE — 3074F SYST BP LT 130 MM HG: CPT | Performed by: OBSTETRICS & GYNECOLOGY

## 2021-12-02 NOTE — PROGRESS NOTES
Alexandr Valdez  Pt is a 28year old  at 26w1d   Doing well. No complaints. Denies LOF/VB/uctx. +FM. Rh +             RTC 2 weeks with US for placentation.       Dr. Alina Mcfadden MD    [facility]

## 2021-12-22 ENCOUNTER — TELEPHONE (OUTPATIENT)
Dept: OBGYN CLINIC | Facility: CLINIC | Age: 31
End: 2021-12-22

## 2021-12-22 ENCOUNTER — NURSE ONLY (OUTPATIENT)
Dept: OBGYN CLINIC | Facility: CLINIC | Age: 31
End: 2021-12-22
Payer: COMMERCIAL

## 2021-12-22 DIAGNOSIS — R39.9 URINARY SYMPTOM OR SIGN: Primary | ICD-10-CM

## 2021-12-22 PROCEDURE — 81002 URINALYSIS NONAUTO W/O SCOPE: CPT | Performed by: OBSTETRICS & GYNECOLOGY

## 2021-12-22 PROCEDURE — 87086 URINE CULTURE/COLONY COUNT: CPT | Performed by: OBSTETRICS & GYNECOLOGY

## 2021-12-22 NOTE — PROGRESS NOTES
Pt here for nurse visit for UTI. Pt c/o frequent urination and pain with urination. Pt is 29w1d pregnant. Pt NKDA. This MA gave pt report to in office doc [facility], per Dr. Eric Feliz sent urine for culture and wait to treat per results of urine culture.  Pt

## 2021-12-22 NOTE — TELEPHONE ENCOUNTER
Pt stating that she believes she has a UTI - states she has ANDRIA appt next week, but wants to know if she needs to come in sooner or just be sent a prescription to treat symptoms?

## 2021-12-31 ENCOUNTER — ROUTINE PRENATAL (OUTPATIENT)
Dept: OBGYN CLINIC | Facility: CLINIC | Age: 31
End: 2021-12-31
Payer: COMMERCIAL

## 2021-12-31 ENCOUNTER — ULTRASOUND ENCOUNTER (OUTPATIENT)
Dept: OBGYN CLINIC | Facility: CLINIC | Age: 31
End: 2021-12-31
Payer: COMMERCIAL

## 2021-12-31 VITALS
SYSTOLIC BLOOD PRESSURE: 130 MMHG | DIASTOLIC BLOOD PRESSURE: 74 MMHG | HEIGHT: 62 IN | BODY MASS INDEX: 41.37 KG/M2 | WEIGHT: 224.81 LBS

## 2021-12-31 DIAGNOSIS — Z34.82 MULTIGRAVIDA IN SECOND TRIMESTER: Primary | ICD-10-CM

## 2021-12-31 DIAGNOSIS — Z34.82 MULTIGRAVIDA IN SECOND TRIMESTER: ICD-10-CM

## 2021-12-31 PROCEDURE — 76816 OB US FOLLOW-UP PER FETUS: CPT | Performed by: OBSTETRICS & GYNECOLOGY

## 2021-12-31 PROCEDURE — 3008F BODY MASS INDEX DOCD: CPT | Performed by: OBSTETRICS & GYNECOLOGY

## 2021-12-31 PROCEDURE — 81002 URINALYSIS NONAUTO W/O SCOPE: CPT | Performed by: OBSTETRICS & GYNECOLOGY

## 2021-12-31 PROCEDURE — 3075F SYST BP GE 130 - 139MM HG: CPT | Performed by: OBSTETRICS & GYNECOLOGY

## 2021-12-31 PROCEDURE — 3078F DIAST BP <80 MM HG: CPT | Performed by: OBSTETRICS & GYNECOLOGY

## 2021-12-31 NOTE — PROGRESS NOTES
Jayne   Pt is a 28year old  at [facility] well. No complaints. Blood sugar well controlled: Fasting less than 95, 2-hour postprandial less than 120. Denies LOF/VB/uctx. +FM.    Rh +, TDAP next visit     BPM   Reviewed normal growth ultrasou

## 2022-01-17 ENCOUNTER — TELEPHONE (OUTPATIENT)
Dept: OBGYN CLINIC | Facility: CLINIC | Age: 32
End: 2022-01-17

## 2022-01-17 NOTE — TELEPHONE ENCOUNTER
Called pt and informed providers deliver at [facility] uses same Epic system therefore my not need to pre-register. Pt agrees.

## 2022-01-17 NOTE — TELEPHONE ENCOUNTER
The patient called to verify were she would be  giving birth. The [facility] at [address] [place] [address] or would it be the [facility].

## 2022-01-19 ENCOUNTER — ROUTINE PRENATAL (OUTPATIENT)
Dept: OBGYN CLINIC | Facility: CLINIC | Age: 32
End: 2022-01-19
Payer: COMMERCIAL

## 2022-01-19 VITALS
SYSTOLIC BLOOD PRESSURE: 124 MMHG | WEIGHT: 227.81 LBS | DIASTOLIC BLOOD PRESSURE: 80 MMHG | HEIGHT: 62 IN | BODY MASS INDEX: 41.92 KG/M2

## 2022-01-19 DIAGNOSIS — O24.410 DIET CONTROLLED GESTATIONAL DIABETES MELLITUS (GDM) IN THIRD TRIMESTER: ICD-10-CM

## 2022-01-19 DIAGNOSIS — Z36.9 UNSPECIFIED ANTENATAL SCREENING: Primary | ICD-10-CM

## 2022-01-19 PROCEDURE — 90471 IMMUNIZATION ADMIN: CPT | Performed by: OBSTETRICS & GYNECOLOGY

## 2022-01-19 PROCEDURE — 90715 TDAP VACCINE 7 YRS/> IM: CPT | Performed by: OBSTETRICS & GYNECOLOGY

## 2022-01-19 PROCEDURE — 3074F SYST BP LT 130 MM HG: CPT | Performed by: OBSTETRICS & GYNECOLOGY

## 2022-01-19 PROCEDURE — 3079F DIAST BP 80-89 MM HG: CPT | Performed by: OBSTETRICS & GYNECOLOGY

## 2022-01-19 PROCEDURE — 3008F BODY MASS INDEX DOCD: CPT | Performed by: OBSTETRICS & GYNECOLOGY

## 2022-01-19 PROCEDURE — 81002 URINALYSIS NONAUTO W/O SCOPE: CPT | Performed by: OBSTETRICS & GYNECOLOGY

## 2022-01-31 ENCOUNTER — ROUTINE PRENATAL (OUTPATIENT)
Dept: OBGYN CLINIC | Facility: CLINIC | Age: 32
End: 2022-01-31
Payer: COMMERCIAL

## 2022-01-31 VITALS
DIASTOLIC BLOOD PRESSURE: 80 MMHG | WEIGHT: 230 LBS | HEIGHT: 62 IN | BODY MASS INDEX: 42.33 KG/M2 | SYSTOLIC BLOOD PRESSURE: 120 MMHG

## 2022-01-31 DIAGNOSIS — Z36.9 UNSPECIFIED ANTENATAL SCREENING: Primary | ICD-10-CM

## 2022-01-31 PROBLEM — Z34.83 MULTIGRAVIDA IN THIRD TRIMESTER (HCC): Status: ACTIVE | Noted: 2021-09-01

## 2022-01-31 PROBLEM — Z34.83 MULTIGRAVIDA IN THIRD TRIMESTER: Status: ACTIVE | Noted: 2021-09-01

## 2022-01-31 PROBLEM — O24.410 DIET CONTROLLED GESTATIONAL DIABETES MELLITUS (GDM) IN THIRD TRIMESTER: Chronic | Status: ACTIVE | Noted: 2021-09-18

## 2022-01-31 PROBLEM — O24.410 DIET CONTROLLED GESTATIONAL DIABETES MELLITUS (GDM) IN THIRD TRIMESTER (HCC): Chronic | Status: ACTIVE | Noted: 2021-09-18

## 2022-01-31 PROCEDURE — 81002 URINALYSIS NONAUTO W/O SCOPE: CPT | Performed by: OBSTETRICS & GYNECOLOGY

## 2022-01-31 PROCEDURE — 3008F BODY MASS INDEX DOCD: CPT | Performed by: OBSTETRICS & GYNECOLOGY

## 2022-01-31 PROCEDURE — 3079F DIAST BP 80-89 MM HG: CPT | Performed by: OBSTETRICS & GYNECOLOGY

## 2022-01-31 PROCEDURE — 3074F SYST BP LT 130 MM HG: CPT | Performed by: OBSTETRICS & GYNECOLOGY

## 2022-02-07 ENCOUNTER — ROUTINE PRENATAL (OUTPATIENT)
Dept: OBGYN CLINIC | Facility: CLINIC | Age: 32
End: 2022-02-07
Payer: COMMERCIAL

## 2022-02-07 VITALS
BODY MASS INDEX: 42.73 KG/M2 | HEIGHT: 62 IN | SYSTOLIC BLOOD PRESSURE: 122 MMHG | WEIGHT: 232.19 LBS | DIASTOLIC BLOOD PRESSURE: 76 MMHG

## 2022-02-07 DIAGNOSIS — Z36.9 UNSPECIFIED ANTENATAL SCREENING: Primary | ICD-10-CM

## 2022-02-07 DIAGNOSIS — O24.410 DIET CONTROLLED GESTATIONAL DIABETES MELLITUS (GDM) IN THIRD TRIMESTER: ICD-10-CM

## 2022-02-07 PROCEDURE — 3074F SYST BP LT 130 MM HG: CPT | Performed by: OBSTETRICS & GYNECOLOGY

## 2022-02-07 PROCEDURE — 3008F BODY MASS INDEX DOCD: CPT | Performed by: OBSTETRICS & GYNECOLOGY

## 2022-02-07 PROCEDURE — 81002 URINALYSIS NONAUTO W/O SCOPE: CPT | Performed by: OBSTETRICS & GYNECOLOGY

## 2022-02-07 PROCEDURE — 3078F DIAST BP <80 MM HG: CPT | Performed by: OBSTETRICS & GYNECOLOGY

## 2022-02-07 NOTE — PROGRESS NOTES
Occ farshadsay cortes    28year old  at 35w6d by last menstrual period c/w 8 wks US presenting for return ob visit. Pre-[facility] Care: ordered cbc, rpr, trep  Immunizations: TDap UTD  Return OB  -offered elective IOL after 39 weeks. GBS next visit. Can  patient re IOL further after vaginal exam next visit  - Reviewed: labor s/sx. - The patient's plan for contraception is considering IUD.   - Infant feeding: breast  -Circumision: NA  Patient Active Problem List:     Obesity     Multigravida in third trimester     Diet controlled gestational diabetes mellitus (GDM) in third trimester   Growth US 1569g (Sherrel So) on , repeat US ordered    - Return to clinic in: 1 wk

## 2022-02-09 ENCOUNTER — ULTRASOUND ENCOUNTER (OUTPATIENT)
Dept: OBGYN CLINIC | Facility: CLINIC | Age: 32
End: 2022-02-09
Payer: COMMERCIAL

## 2022-02-09 DIAGNOSIS — O24.410 DIET CONTROLLED GESTATIONAL DIABETES MELLITUS (GDM) IN THIRD TRIMESTER: ICD-10-CM

## 2022-02-09 PROCEDURE — 76816 OB US FOLLOW-UP PER FETUS: CPT | Performed by: OBSTETRICS & GYNECOLOGY

## 2022-02-11 ENCOUNTER — LAB ENCOUNTER (OUTPATIENT)
Dept: LAB | Age: 32
End: 2022-02-11
Attending: FAMILY MEDICINE
Payer: COMMERCIAL

## 2022-02-11 DIAGNOSIS — Z36.9 UNSPECIFIED ANTENATAL SCREENING: ICD-10-CM

## 2022-02-11 LAB
BASOPHILS # BLD AUTO: 0.03 X10(3) UL (ref 0–0.2)
BASOPHILS NFR BLD AUTO: 0.3 %
DEPRECATED RDW RBC AUTO: 42.1 FL (ref 35.1–46.3)
EOSINOPHIL # BLD AUTO: 0.13 X10(3) UL (ref 0–0.7)
ERYTHROCYTE [DISTWIDTH] IN BLOOD BY AUTOMATED COUNT: 13.5 % (ref 11–15)
HCT VFR BLD AUTO: 32.4 %
HGB BLD-MCNC: 10.1 G/DL
IMM GRANULOCYTES # BLD AUTO: 0.08 X10(3) UL (ref 0–1)
IMM GRANULOCYTES NFR BLD: 0.7 %
LYMPHOCYTES # BLD AUTO: 1.85 X10(3) UL (ref 1–4)
MCH RBC QN AUTO: 26.6 PG (ref 26–34)
MCV RBC AUTO: 85.3 FL
MONOCYTES # BLD AUTO: 0.76 X10(3) UL (ref 0.1–1)
MONOCYTES NFR BLD AUTO: 7.1 %
NEUTROPHILS # BLD AUTO: 7.9 X10 (3) UL (ref 1.5–7.7)
NEUTROPHILS # BLD AUTO: 7.9 X10(3) UL (ref 1.5–7.7)
NEUTROPHILS NFR BLD AUTO: 73.5 %
PLATELET # BLD AUTO: 372 10(3)UL (ref 150–450)
RBC # BLD AUTO: 3.8 X10(6)UL
WBC # BLD AUTO: 10.8 X10(3) UL (ref 4–11)

## 2022-02-11 PROCEDURE — 87389 HIV-1 AG W/HIV-1&-2 AB AG IA: CPT

## 2022-02-11 PROCEDURE — 85025 COMPLETE CBC W/AUTO DIFF WBC: CPT

## 2022-02-11 PROCEDURE — 36415 COLL VENOUS BLD VENIPUNCTURE: CPT

## 2022-02-11 PROCEDURE — 86780 TREPONEMA PALLIDUM: CPT

## 2022-02-14 ENCOUNTER — ROUTINE PRENATAL (OUTPATIENT)
Dept: OBGYN CLINIC | Facility: CLINIC | Age: 32
End: 2022-02-14
Payer: COMMERCIAL

## 2022-02-14 ENCOUNTER — TELEPHONE (OUTPATIENT)
Dept: OBGYN CLINIC | Facility: CLINIC | Age: 32
End: 2022-02-14

## 2022-02-14 VITALS
SYSTOLIC BLOOD PRESSURE: 142 MMHG | WEIGHT: 231 LBS | BODY MASS INDEX: 42.51 KG/M2 | HEIGHT: 62 IN | DIASTOLIC BLOOD PRESSURE: 78 MMHG

## 2022-02-14 DIAGNOSIS — R60.9 SWELLING: ICD-10-CM

## 2022-02-14 DIAGNOSIS — Z36.9 UNSPECIFIED ANTENATAL SCREENING: Primary | ICD-10-CM

## 2022-02-14 PROBLEM — R03.0 ELEVATED BLOOD PRESSURE READING: Status: ACTIVE | Noted: 2022-02-14

## 2022-02-14 LAB
GLUCOSE (URINE DIPSTICK): NEGATIVE MG/DL
MULTISTIX LOT#: NORMAL NUMERIC
PROTEIN (URINE DIPSTICK): NEGATIVE MG/DL
T PALLIDUM AB SER QL: NEGATIVE

## 2022-02-14 PROCEDURE — 3077F SYST BP >= 140 MM HG: CPT | Performed by: OBSTETRICS & GYNECOLOGY

## 2022-02-14 PROCEDURE — 87081 CULTURE SCREEN ONLY: CPT | Performed by: OBSTETRICS & GYNECOLOGY

## 2022-02-14 PROCEDURE — 3008F BODY MASS INDEX DOCD: CPT | Performed by: OBSTETRICS & GYNECOLOGY

## 2022-02-14 PROCEDURE — 87653 STREP B DNA AMP PROBE: CPT | Performed by: OBSTETRICS & GYNECOLOGY

## 2022-02-14 PROCEDURE — 3078F DIAST BP <80 MM HG: CPT | Performed by: OBSTETRICS & GYNECOLOGY

## 2022-02-14 PROCEDURE — 81002 URINALYSIS NONAUTO W/O SCOPE: CPT | Performed by: OBSTETRICS & GYNECOLOGY

## 2022-02-14 RX ORDER — FERROUS SULFATE 325(65) MG
325 TABLET ORAL
Qty: 90 TABLET | Refills: 2 | Status: SHIPPED | OUTPATIENT
Start: 2022-02-14

## 2022-02-14 RX ORDER — ADHESIVE BANDAGE 3/4"
BANDAGE TOPICAL
Qty: 1 EACH | Refills: 0 | Status: SHIPPED | OUTPATIENT
Start: 2022-02-14

## 2022-02-14 NOTE — PROGRESS NOTES
Nikky Arevalo  Pt is a 28year old  at [address]. Noted increased swelling bilaterally in her upper and lower extremities. Denies LOF/VB/uctx. +FM. Mode of delivery:  anticipated      BPM   FH N/a   GBS collected today. CBC, CMP, urine p/c ratio, BP cuff. Pt to call if BP > 140/90 and be sent to [facility] triage if elevated.      RTC 1 week    Dr. Severa Boehringer, MD    [facility]

## 2022-02-15 ENCOUNTER — LAB ENCOUNTER (OUTPATIENT)
Dept: LAB | Age: 32
End: 2022-02-15
Attending: OBSTETRICS & GYNECOLOGY
Payer: COMMERCIAL

## 2022-02-15 DIAGNOSIS — R60.9 SWELLING: ICD-10-CM

## 2022-02-15 LAB
ALBUMIN SERPL-MCNC: 2.7 G/DL (ref 3.4–5)
ALBUMIN/GLOB SERPL: 0.7 {RATIO} (ref 1–2)
ALP LIVER SERPL-CCNC: 122 U/L
ALT SERPL-CCNC: 15 U/L
ANION GAP SERPL CALC-SCNC: 3 MMOL/L (ref 0–18)
AST SERPL-CCNC: 19 U/L (ref 15–37)
BASOPHILS # BLD AUTO: 0.03 X10(3) UL (ref 0–0.2)
BASOPHILS NFR BLD AUTO: 0.3 %
BILIRUB SERPL-MCNC: 0.2 MG/DL (ref 0.1–2)
BUN BLD-MCNC: 9 MG/DL (ref 7–18)
BUN/CREAT SERPL: 15.5 (ref 10–20)
CALCIUM BLD-MCNC: 9.2 MG/DL (ref 8.5–10.1)
CHLORIDE SERPL-SCNC: 107 MMOL/L (ref 98–112)
CO2 SERPL-SCNC: 26 MMOL/L (ref 21–32)
CREAT BLD-MCNC: 0.58 MG/DL
CREAT UR-SCNC: 157 MG/DL
DEPRECATED RDW RBC AUTO: 42 FL (ref 35.1–46.3)
EOSINOPHIL NFR BLD AUTO: 1.2 %
ERYTHROCYTE [DISTWIDTH] IN BLOOD BY AUTOMATED COUNT: 13.7 % (ref 11–15)
FASTING STATUS PATIENT QL REPORTED: NO
GLOBULIN PLAS-MCNC: 4.1 G/DL (ref 2.8–4.4)
GLUCOSE BLD-MCNC: 96 MG/DL (ref 70–99)
HCT VFR BLD AUTO: 32 %
HGB BLD-MCNC: 10.1 G/DL
IMM GRANULOCYTES # BLD AUTO: 0.04 X10(3) UL (ref 0–1)
IMM GRANULOCYTES NFR BLD: 0.4 %
LYMPHOCYTES # BLD AUTO: 1.97 X10(3) UL (ref 1–4)
LYMPHOCYTES NFR BLD AUTO: 18.7 %
MCH RBC QN AUTO: 26.7 PG (ref 26–34)
MCHC RBC AUTO-ENTMCNC: 31.6 G/DL (ref 31–37)
MCV RBC AUTO: 84.7 FL
MONOCYTES # BLD AUTO: 0.81 X10(3) UL (ref 0.1–1)
MONOCYTES NFR BLD AUTO: 7.7 %
NEUTROPHILS # BLD AUTO: 7.58 X10 (3) UL (ref 1.5–7.7)
NEUTROPHILS # BLD AUTO: 7.58 X10(3) UL (ref 1.5–7.7)
NEUTROPHILS NFR BLD AUTO: 71.7 %
OSMOLALITY SERPL CALC.SUM OF ELEC: 281 MOSM/KG (ref 275–295)
PLATELET # BLD AUTO: 375 10(3)UL (ref 150–450)
POTASSIUM SERPL-SCNC: 4.1 MMOL/L (ref 3.5–5.1)
PROT SERPL-MCNC: 6.8 G/DL (ref 6.4–8.2)
PROT UR-MCNC: 24 MG/DL
PROT/CREAT UR-RTO: 0.15
RBC # BLD AUTO: 3.78 X10(6)UL
SODIUM SERPL-SCNC: 136 MMOL/L (ref 136–145)
WBC # BLD AUTO: 10.6 X10(3) UL (ref 4–11)

## 2022-02-15 PROCEDURE — 80053 COMPREHEN METABOLIC PANEL: CPT

## 2022-02-15 PROCEDURE — 84156 ASSAY OF PROTEIN URINE: CPT

## 2022-02-15 PROCEDURE — 85025 COMPLETE CBC W/AUTO DIFF WBC: CPT

## 2022-02-15 PROCEDURE — 82570 ASSAY OF URINE CREATININE: CPT

## 2022-02-15 PROCEDURE — 36415 COLL VENOUS BLD VENIPUNCTURE: CPT

## 2022-02-15 NOTE — ADDENDUM NOTE
Addended by: Lazaro Washington on: 2/14/2022 06:15 PM     Modules accepted: Orders Given IV Rocephin in the ED and Rx for Cipro. Sensitivities pending.

## 2022-02-16 ENCOUNTER — TELEPHONE (OUTPATIENT)
Dept: OBGYN CLINIC | Facility: CLINIC | Age: 32
End: 2022-02-16

## 2022-02-16 LAB — GROUP B STREP BY PCR FOR PCR OVT: POSITIVE

## 2022-02-22 ENCOUNTER — ROUTINE PRENATAL (OUTPATIENT)
Dept: OBGYN CLINIC | Facility: CLINIC | Age: 32
End: 2022-02-22
Payer: COMMERCIAL

## 2022-02-22 VITALS
HEIGHT: 62 IN | SYSTOLIC BLOOD PRESSURE: 128 MMHG | DIASTOLIC BLOOD PRESSURE: 74 MMHG | WEIGHT: 233 LBS | BODY MASS INDEX: 42.88 KG/M2

## 2022-02-22 DIAGNOSIS — Z34.83 MULTIGRAVIDA IN THIRD TRIMESTER: ICD-10-CM

## 2022-02-22 DIAGNOSIS — O24.410 DIET CONTROLLED GESTATIONAL DIABETES MELLITUS (GDM) IN THIRD TRIMESTER: Chronic | ICD-10-CM

## 2022-02-22 DIAGNOSIS — Z36.9 UNSPECIFIED ANTENATAL SCREENING: Primary | ICD-10-CM

## 2022-02-22 PROBLEM — O99.820 GROUP B STREPTOCOCCUS CARRIER, ANTEPARTUM (HCC): Status: ACTIVE | Noted: 2022-02-22

## 2022-02-22 PROBLEM — O99.820 GROUP B STREPTOCOCCUS CARRIER, ANTEPARTUM: Status: ACTIVE | Noted: 2022-02-22

## 2022-02-22 PROCEDURE — 81002 URINALYSIS NONAUTO W/O SCOPE: CPT | Performed by: OBSTETRICS & GYNECOLOGY

## 2022-02-22 PROCEDURE — 3074F SYST BP LT 130 MM HG: CPT | Performed by: OBSTETRICS & GYNECOLOGY

## 2022-02-22 PROCEDURE — 3078F DIAST BP <80 MM HG: CPT | Performed by: OBSTETRICS & GYNECOLOGY

## 2022-02-22 PROCEDURE — 3008F BODY MASS INDEX DOCD: CPT | Performed by: OBSTETRICS & GYNECOLOGY

## 2022-02-22 NOTE — PROGRESS NOTES
FBS 80, 2 hour PP BS are less than 120. DW pt treatment of GBBS. DW pt labor precautions. SVE 2/50/-3.

## 2022-02-24 ENCOUNTER — TELEPHONE (OUTPATIENT)
Dept: OBGYN CLINIC | Facility: CLINIC | Age: 32
End: 2022-02-24

## 2022-02-24 NOTE — TELEPHONE ENCOUNTER
Called pt c/o mucus with some blood, red, + FM, denies contx, SROM, vaginal bleeding as period. Pt had SVE on 02/22/22 2/50-3. Labor precautions provided. Pt agrees.

## 2022-02-28 ENCOUNTER — ROUTINE PRENATAL (OUTPATIENT)
Dept: OBGYN CLINIC | Facility: CLINIC | Age: 32
End: 2022-02-28
Payer: COMMERCIAL

## 2022-02-28 VITALS — BODY MASS INDEX: 43 KG/M2 | SYSTOLIC BLOOD PRESSURE: 128 MMHG | HEIGHT: 62 IN | DIASTOLIC BLOOD PRESSURE: 72 MMHG

## 2022-02-28 DIAGNOSIS — Z36.9 UNSPECIFIED ANTENATAL SCREENING: Primary | ICD-10-CM

## 2022-02-28 PROCEDURE — 81002 URINALYSIS NONAUTO W/O SCOPE: CPT | Performed by: OBSTETRICS & GYNECOLOGY

## 2022-02-28 PROCEDURE — 3078F DIAST BP <80 MM HG: CPT | Performed by: OBSTETRICS & GYNECOLOGY

## 2022-02-28 PROCEDURE — 3074F SYST BP LT 130 MM HG: CPT | Performed by: OBSTETRICS & GYNECOLOGY

## 2022-02-28 NOTE — PROGRESS NOTES
Doing well. Irregular contractions. More discharge above normal. Possibly LOF. + FM. SSE negative pooling, Nitrazine and ferning.

## 2022-03-09 ENCOUNTER — ROUTINE PRENATAL (OUTPATIENT)
Dept: OBGYN CLINIC | Facility: CLINIC | Age: 32
End: 2022-03-09
Payer: COMMERCIAL

## 2022-03-09 ENCOUNTER — ORDERS FOR ADMISSION (OUTPATIENT)
Dept: OBGYN CLINIC | Facility: CLINIC | Age: 32
End: 2022-03-09

## 2022-03-09 ENCOUNTER — LAB ENCOUNTER (OUTPATIENT)
Dept: LAB | Age: 32
End: 2022-03-09
Attending: OBSTETRICS & GYNECOLOGY
Payer: COMMERCIAL

## 2022-03-09 VITALS
WEIGHT: 236 LBS | HEIGHT: 62 IN | DIASTOLIC BLOOD PRESSURE: 70 MMHG | BODY MASS INDEX: 43.43 KG/M2 | SYSTOLIC BLOOD PRESSURE: 122 MMHG

## 2022-03-09 DIAGNOSIS — Z36.9 UNSPECIFIED ANTENATAL SCREENING: ICD-10-CM

## 2022-03-09 DIAGNOSIS — Z36.9 UNSPECIFIED ANTENATAL SCREENING: Primary | ICD-10-CM

## 2022-03-09 LAB
MULTISTIX LOT#: NORMAL NUMERIC
PROTEIN (URINE DIPSTICK): NEGATIVE MG/DL
SARS-COV-2 RNA RESP QL NAA+PROBE: NOT DETECTED

## 2022-03-09 PROCEDURE — 3074F SYST BP LT 130 MM HG: CPT | Performed by: OBSTETRICS & GYNECOLOGY

## 2022-03-09 PROCEDURE — 3078F DIAST BP <80 MM HG: CPT | Performed by: OBSTETRICS & GYNECOLOGY

## 2022-03-09 PROCEDURE — 59025 FETAL NON-STRESS TEST: CPT | Performed by: OBSTETRICS & GYNECOLOGY

## 2022-03-09 PROCEDURE — 81002 URINALYSIS NONAUTO W/O SCOPE: CPT | Performed by: OBSTETRICS & GYNECOLOGY

## 2022-03-09 PROCEDURE — 3008F BODY MASS INDEX DOCD: CPT | Performed by: OBSTETRICS & GYNECOLOGY

## 2022-03-09 RX ORDER — TERBUTALINE SULFATE 1 MG/ML
0.25 INJECTION, SOLUTION SUBCUTANEOUS AS NEEDED
Status: CANCELLED | OUTPATIENT
Start: 2022-03-09

## 2022-03-09 RX ORDER — ACETAMINOPHEN 500 MG
500 TABLET ORAL EVERY 6 HOURS PRN
Status: CANCELLED | OUTPATIENT
Start: 2022-03-09

## 2022-03-09 RX ORDER — IBUPROFEN 600 MG/1
600 TABLET ORAL EVERY 6 HOURS PRN
Status: CANCELLED | OUTPATIENT
Start: 2022-03-09 | End: 2022-03-11

## 2022-03-09 RX ORDER — LIDOCAINE HYDROCHLORIDE 10 MG/ML
30 INJECTION, SOLUTION EPIDURAL; INFILTRATION; INTRACAUDAL; PERINEURAL ONCE
Status: CANCELLED | OUTPATIENT
Start: 2022-03-09 | End: 2022-03-09

## 2022-03-09 RX ORDER — ONDANSETRON 2 MG/ML
4 INJECTION INTRAMUSCULAR; INTRAVENOUS EVERY 6 HOURS PRN
Status: CANCELLED | OUTPATIENT
Start: 2022-03-09

## 2022-03-09 RX ORDER — AMMONIA INHALANTS 0.04 G/.3ML
0.3 INHALANT RESPIRATORY (INHALATION) AS NEEDED
Status: CANCELLED | OUTPATIENT
Start: 2022-03-09

## 2022-03-09 RX ORDER — TRISODIUM CITRATE DIHYDRATE AND CITRIC ACID MONOHYDRATE 500; 334 MG/5ML; MG/5ML
30 SOLUTION ORAL AS NEEDED
Status: CANCELLED | OUTPATIENT
Start: 2022-03-09

## 2022-03-09 NOTE — PROGRESS NOTES
No CTX, VB, and LOF. + FM. SVE 3-4/50/-3 membranes stripped. IOL scheduled for Friday morning at 6 am with pitocin. NST reactive and reassuring. Dr. Gena Shields MD    [address] [place] [address]     This note was created by [employer] voice recognition. Errors in content may be related to improper recognition by the system; efforts to review and correct have been done but errors may still exist. Please be advised the primary purpose of this note is for me to communicate medical care. Standard sentence structure is not always used. Medical terminology and medical abbreviations may be used. There may be grammatical, typographical, and automated fill ins that may have errors missed in proofreading.

## 2022-03-11 ENCOUNTER — ANESTHESIA EVENT (OUTPATIENT)
Dept: OBGYN UNIT | Facility: HOSPITAL | Age: 32
End: 2022-03-11
Payer: COMMERCIAL

## 2022-03-11 ENCOUNTER — HOSPITAL ENCOUNTER (INPATIENT)
Facility: HOSPITAL | Age: 32
LOS: 2 days | Discharge: HOME | End: 2022-03-13
Attending: OBSTETRICS & GYNECOLOGY | Admitting: OBSTETRICS & GYNECOLOGY
Payer: COMMERCIAL

## 2022-03-11 ENCOUNTER — APPOINTMENT (OUTPATIENT)
Dept: OBGYN CLINIC | Facility: HOSPITAL | Age: 32
End: 2022-03-11
Attending: OBSTETRICS & GYNECOLOGY
Payer: COMMERCIAL

## 2022-03-11 ENCOUNTER — ANESTHESIA (OUTPATIENT)
Dept: OBGYN UNIT | Facility: HOSPITAL | Age: 32
End: 2022-03-11
Payer: COMMERCIAL

## 2022-03-11 PROBLEM — O48.0 POST-DATES PREGNANCY (HCC): Status: ACTIVE | Noted: 2022-03-11

## 2022-03-11 PROBLEM — O48.0 POST-DATES PREGNANCY: Status: ACTIVE | Noted: 2022-03-11

## 2022-03-11 PROBLEM — O14.90 PREECLAMPSIA: Status: ACTIVE | Noted: 2022-03-11

## 2022-03-11 PROBLEM — O14.90 PREECLAMPSIA (HCC): Status: ACTIVE | Noted: 2022-03-11

## 2022-03-11 LAB
ANTIBODY SCREEN: NEGATIVE
APTT PPP: 29.7 SECONDS (ref 23.3–35.6)
BASOPHILS # BLD AUTO: 0.02 X10(3) UL (ref 0–0.2)
BASOPHILS # BLD AUTO: 0.02 X10(3) UL (ref 0–0.2)
BASOPHILS NFR BLD AUTO: 0.1 %
BASOPHILS NFR BLD AUTO: 0.2 %
CREAT UR-SCNC: <13 MG/DL
DEPRECATED RDW RBC AUTO: 42.1 FL (ref 35.1–46.3)
DEPRECATED RDW RBC AUTO: 44.7 FL (ref 35.1–46.3)
EOSINOPHIL # BLD AUTO: 0.06 X10(3) UL (ref 0–0.7)
EOSINOPHIL # BLD AUTO: 0.14 X10(3) UL (ref 0–0.7)
EOSINOPHIL NFR BLD AUTO: 0.4 %
EOSINOPHIL NFR BLD AUTO: 1.3 %
ERYTHROCYTE [DISTWIDTH] IN BLOOD BY AUTOMATED COUNT: 14.5 % (ref 11–15)
ERYTHROCYTE [DISTWIDTH] IN BLOOD BY AUTOMATED COUNT: 14.6 % (ref 11–15)
FIBRINOGEN PPP-MCNC: 451 MG/DL (ref 180–480)
GLUCOSE BLDC GLUCOMTR-MCNC: 113 MG/DL (ref 70–99)
GLUCOSE BLDC GLUCOMTR-MCNC: 76 MG/DL (ref 70–99)
GLUCOSE BLDC GLUCOMTR-MCNC: 77 MG/DL (ref 70–99)
GLUCOSE BLDC GLUCOMTR-MCNC: 77 MG/DL (ref 70–99)
GLUCOSE BLDC GLUCOMTR-MCNC: 79 MG/DL (ref 70–99)
GLUCOSE BLDC GLUCOMTR-MCNC: 87 MG/DL (ref 70–99)
HCT VFR BLD AUTO: 32.8 %
HCT VFR BLD AUTO: 36.1 %
HGB BLD-MCNC: 10.3 G/DL
HGB BLD-MCNC: 11.1 G/DL
IMM GRANULOCYTES # BLD AUTO: 0.05 X10(3) UL (ref 0–1)
IMM GRANULOCYTES # BLD AUTO: 0.07 X10(3) UL (ref 0–1)
IMM GRANULOCYTES NFR BLD: 0.4 %
IMM GRANULOCYTES NFR BLD: 0.5 %
LYMPHOCYTES # BLD AUTO: 2.06 X10(3) UL (ref 1–4)
LYMPHOCYTES # BLD AUTO: 2.08 X10(3) UL (ref 1–4)
LYMPHOCYTES NFR BLD AUTO: 12.3 %
LYMPHOCYTES NFR BLD AUTO: 18.5 %
MCH RBC QN AUTO: 25.6 PG (ref 26–34)
MCH RBC QN AUTO: 25.8 PG (ref 26–34)
MCHC RBC AUTO-ENTMCNC: 30.7 G/DL (ref 31–37)
MCHC RBC AUTO-ENTMCNC: 31.4 G/DL (ref 31–37)
MCV RBC AUTO: 82 FL
MCV RBC AUTO: 83.4 FL
MONOCYTES # BLD AUTO: 0.66 X10(3) UL (ref 0.1–1)
MONOCYTES # BLD AUTO: 0.95 X10(3) UL (ref 0.1–1)
MONOCYTES NFR BLD AUTO: 5.6 %
MONOCYTES NFR BLD AUTO: 5.9 %
NEUTROPHILS # BLD AUTO: 13.78 X10 (3) UL (ref 1.5–7.7)
NEUTROPHILS # BLD AUTO: 13.78 X10(3) UL (ref 1.5–7.7)
NEUTROPHILS # BLD AUTO: 8.18 X10 (3) UL (ref 1.5–7.7)
NEUTROPHILS # BLD AUTO: 8.18 X10(3) UL (ref 1.5–7.7)
NEUTROPHILS NFR BLD AUTO: 73.6 %
NEUTROPHILS NFR BLD AUTO: 81.2 %
PLATELET # BLD AUTO: 334 10(3)UL (ref 150–450)
PLATELET # BLD AUTO: 336 10(3)UL (ref 150–450)
PROT UR-MCNC: <5 MG/DL
RBC # BLD AUTO: 4 X10(6)UL
RBC # BLD AUTO: 4.33 X10(6)UL
RH BLOOD TYPE: POSITIVE
WBC # BLD AUTO: 11.1 X10(3) UL (ref 4–11)
WBC # BLD AUTO: 17 X10(3) UL (ref 4–11)

## 2022-03-11 PROCEDURE — 0KQM0ZZ REPAIR PERINEUM MUSCLE, OPEN APPROACH: ICD-10-PCS | Performed by: OBSTETRICS & GYNECOLOGY

## 2022-03-11 PROCEDURE — 3E033VJ INTRODUCTION OF OTHER HORMONE INTO PERIPHERAL VEIN, PERCUTANEOUS APPROACH: ICD-10-PCS | Performed by: OBSTETRICS & GYNECOLOGY

## 2022-03-11 PROCEDURE — 59400 OBSTETRICAL CARE: CPT | Performed by: OBSTETRICS & GYNECOLOGY

## 2022-03-11 PROCEDURE — 10907ZC DRAINAGE OF AMNIOTIC FLUID, THERAPEUTIC FROM PRODUCTS OF CONCEPTION, VIA NATURAL OR ARTIFICIAL OPENING: ICD-10-PCS | Performed by: OBSTETRICS & GYNECOLOGY

## 2022-03-11 RX ORDER — BUPIVACAINE HYDROCHLORIDE 2.5 MG/ML
INJECTION, SOLUTION EPIDURAL; INFILTRATION; INTRACAUDAL AS NEEDED
Status: DISCONTINUED | OUTPATIENT
Start: 2022-03-11 | End: 2022-03-11 | Stop reason: SURG

## 2022-03-11 RX ORDER — SODIUM CHLORIDE, SODIUM LACTATE, POTASSIUM CHLORIDE, CALCIUM CHLORIDE 600; 310; 30; 20 MG/100ML; MG/100ML; MG/100ML; MG/100ML
INJECTION, SOLUTION INTRAVENOUS CONTINUOUS
Status: DISCONTINUED | OUTPATIENT
Start: 2022-03-11 | End: 2022-03-13

## 2022-03-11 RX ORDER — LIDOCAINE HYDROCHLORIDE AND EPINEPHRINE 15; 5 MG/ML; UG/ML
INJECTION, SOLUTION EPIDURAL
Status: COMPLETED | OUTPATIENT
Start: 2022-03-11 | End: 2022-03-11

## 2022-03-11 RX ORDER — METHYLERGONOVINE MALEATE 0.2 MG/ML
INJECTION INTRAVENOUS
Status: COMPLETED
Start: 2022-03-11 | End: 2022-03-11

## 2022-03-11 RX ORDER — TRANEXAMIC ACID 10 MG/ML
INJECTION, SOLUTION INTRAVENOUS
Status: DISCONTINUED
Start: 2022-03-11 | End: 2022-03-11 | Stop reason: WASHOUT

## 2022-03-11 RX ORDER — AMMONIA INHALANTS 0.04 G/.3ML
0.3 INHALANT RESPIRATORY (INHALATION) AS NEEDED
Status: DISCONTINUED | OUTPATIENT
Start: 2022-03-11 | End: 2022-03-13

## 2022-03-11 RX ORDER — LIDOCAINE HYDROCHLORIDE 10 MG/ML
30 INJECTION, SOLUTION EPIDURAL; INFILTRATION; INTRACAUDAL; PERINEURAL ONCE
Status: DISCONTINUED | OUTPATIENT
Start: 2022-03-11 | End: 2022-03-12 | Stop reason: HOSPADM

## 2022-03-11 RX ORDER — TRANEXAMIC ACID 10 MG/ML
INJECTION, SOLUTION INTRAVENOUS
Status: COMPLETED
Start: 2022-03-11 | End: 2022-03-11

## 2022-03-11 RX ORDER — ACETAMINOPHEN 325 MG/1
650 TABLET ORAL EVERY 6 HOURS PRN
Status: DISCONTINUED | OUTPATIENT
Start: 2022-03-11 | End: 2022-03-13

## 2022-03-11 RX ORDER — MISOPROSTOL 200 UG/1
TABLET ORAL
Status: COMPLETED
Start: 2022-03-11 | End: 2022-03-11

## 2022-03-11 RX ORDER — LOPERAMIDE HYDROCHLORIDE 2 MG/1
2 CAPSULE ORAL 4 TIMES DAILY PRN
Status: DISCONTINUED | OUTPATIENT
Start: 2022-03-11 | End: 2022-03-13

## 2022-03-11 RX ORDER — DOCUSATE SODIUM 100 MG/1
100 CAPSULE, LIQUID FILLED ORAL
Status: DISCONTINUED | OUTPATIENT
Start: 2022-03-11 | End: 2022-03-13

## 2022-03-11 RX ORDER — ONDANSETRON 2 MG/ML
4 INJECTION INTRAMUSCULAR; INTRAVENOUS EVERY 6 HOURS PRN
Status: DISCONTINUED | OUTPATIENT
Start: 2022-03-11 | End: 2022-03-13

## 2022-03-11 RX ORDER — CALCIUM GLUCONATE 94 MG/ML
1 INJECTION, SOLUTION INTRAVENOUS ONCE AS NEEDED
Status: DISCONTINUED | OUTPATIENT
Start: 2022-03-11 | End: 2022-03-12 | Stop reason: HOSPADM

## 2022-03-11 RX ORDER — BUPIVACAINE HYDROCHLORIDE 2.5 MG/ML
20 INJECTION, SOLUTION EPIDURAL; INFILTRATION; INTRACAUDAL ONCE
Status: DISCONTINUED | OUTPATIENT
Start: 2022-03-11 | End: 2022-03-12 | Stop reason: HOSPADM

## 2022-03-11 RX ORDER — SODIUM CHLORIDE, SODIUM LACTATE, POTASSIUM CHLORIDE, CALCIUM CHLORIDE 600; 310; 30; 20 MG/100ML; MG/100ML; MG/100ML; MG/100ML
INJECTION, SOLUTION INTRAVENOUS CONTINUOUS
Status: DISCONTINUED | OUTPATIENT
Start: 2022-03-11 | End: 2022-03-12 | Stop reason: HOSPADM

## 2022-03-11 RX ORDER — AMMONIA INHALANTS 0.04 G/.3ML
0.3 INHALANT RESPIRATORY (INHALATION) AS NEEDED
Status: DISCONTINUED | OUTPATIENT
Start: 2022-03-11 | End: 2022-03-12 | Stop reason: HOSPADM

## 2022-03-11 RX ORDER — ONDANSETRON 2 MG/ML
4 INJECTION INTRAMUSCULAR; INTRAVENOUS EVERY 6 HOURS PRN
Status: DISCONTINUED | OUTPATIENT
Start: 2022-03-11 | End: 2022-03-12 | Stop reason: HOSPADM

## 2022-03-11 RX ORDER — SIMETHICONE 80 MG
80 TABLET,CHEWABLE ORAL 3 TIMES DAILY PRN
Status: DISCONTINUED | OUTPATIENT
Start: 2022-03-11 | End: 2022-03-13

## 2022-03-11 RX ORDER — CARBOPROST TROMETHAMINE 250 UG/ML
INJECTION, SOLUTION INTRAMUSCULAR
Status: DISCONTINUED
Start: 2022-03-11 | End: 2022-03-11 | Stop reason: WASHOUT

## 2022-03-11 RX ORDER — IBUPROFEN 600 MG/1
600 TABLET ORAL EVERY 6 HOURS
Status: DISCONTINUED | OUTPATIENT
Start: 2022-03-11 | End: 2022-03-13

## 2022-03-11 RX ORDER — BUPIVACAINE HCL/0.9 % NACL/PF 0.25 %
5 PLASTIC BAG, INJECTION (ML) EPIDURAL AS NEEDED
Status: DISCONTINUED | OUTPATIENT
Start: 2022-03-11 | End: 2022-03-13

## 2022-03-11 RX ORDER — DIAPER,BRIEF,INFANT-TODD,DISP
1 EACH MISCELLANEOUS EVERY 6 HOURS PRN
Status: DISCONTINUED | OUTPATIENT
Start: 2022-03-11 | End: 2022-03-13

## 2022-03-11 RX ORDER — TRISODIUM CITRATE DIHYDRATE AND CITRIC ACID MONOHYDRATE 500; 334 MG/5ML; MG/5ML
30 SOLUTION ORAL AS NEEDED
Status: DISCONTINUED | OUTPATIENT
Start: 2022-03-11 | End: 2022-03-12 | Stop reason: HOSPADM

## 2022-03-11 RX ORDER — BISACODYL 10 MG
10 SUPPOSITORY, RECTAL RECTAL ONCE AS NEEDED
Status: DISCONTINUED | OUTPATIENT
Start: 2022-03-11 | End: 2022-03-13

## 2022-03-11 RX ORDER — HYDROMORPHONE HYDROCHLORIDE 1 MG/ML
1 INJECTION, SOLUTION INTRAMUSCULAR; INTRAVENOUS; SUBCUTANEOUS EVERY 2 HOUR PRN
Status: DISCONTINUED | OUTPATIENT
Start: 2022-03-11 | End: 2022-03-13

## 2022-03-11 RX ORDER — TERBUTALINE SULFATE 1 MG/ML
0.25 INJECTION, SOLUTION SUBCUTANEOUS AS NEEDED
Status: DISCONTINUED | OUTPATIENT
Start: 2022-03-11 | End: 2022-03-12 | Stop reason: HOSPADM

## 2022-03-11 RX ORDER — CARBOPROST TROMETHAMINE 250 UG/ML
INJECTION, SOLUTION INTRAMUSCULAR
Status: COMPLETED
Start: 2022-03-11 | End: 2022-03-11

## 2022-03-11 RX ORDER — MISOPROSTOL 200 UG/1
TABLET ORAL
Status: DISCONTINUED
Start: 2022-03-11 | End: 2022-03-11 | Stop reason: WASHOUT

## 2022-03-11 RX ORDER — NALBUPHINE HCL 10 MG/ML
2.5 AMPUL (ML) INJECTION
Status: DISCONTINUED | OUTPATIENT
Start: 2022-03-11 | End: 2022-03-13

## 2022-03-11 RX ORDER — IBUPROFEN 600 MG/1
600 TABLET ORAL EVERY 6 HOURS PRN
Status: DISCONTINUED | OUTPATIENT
Start: 2022-03-11 | End: 2022-03-12 | Stop reason: HOSPADM

## 2022-03-11 RX ORDER — ACETAMINOPHEN 500 MG
500 TABLET ORAL EVERY 6 HOURS PRN
Status: DISCONTINUED | OUTPATIENT
Start: 2022-03-11 | End: 2022-03-12 | Stop reason: HOSPADM

## 2022-03-11 RX ADMIN — LIDOCAINE HYDROCHLORIDE AND EPINEPHRINE 3 ML: 15; 5 INJECTION, SOLUTION EPIDURAL at 12:18:00

## 2022-03-11 RX ADMIN — BUPIVACAINE HYDROCHLORIDE 0.5 ML: 2.5 INJECTION, SOLUTION EPIDURAL; INFILTRATION; INTRACAUDAL at 12:16:00

## 2022-03-11 NOTE — L&D DELIVERY NOTE
Early Garcia, Girl [[id number]]    Labor Events     labor?: No   steroids?: None  Antibiotics received during labor?: Yes  Antibiotics (enter # doses in comment): penicillin  Rupture date/time: 3/11/2022 112     Rupture type: AROM  Fluid color: Clear  Induction: Oxytocin, AROM  Indications for induction: Post-term Gestation, IDDM/GDDM  Induction comment: 40 1/7 weeks  Augmentation: None  Intrapartum & labor complications: Shoulder Dystocia  Intrapartum & labor complications comment: 42 seconds       Labor Event Times    Labor onset date/time: 3/11/2022 112  Dilation complete date/time: 3/11/2022 1548  Start pushing date/time: 3/11/2022 1550     [place] Presentation    Presentation: Vertex  Position: Left Occiput Anterior     Operative Delivery    Operative Vaginal Delivery: N/A            Shoulder Dystocia    Shoulder Dystocia: Yes  Addtional staff called for assistance: Yes  Maneuver performed: Suprapubic pressure on fetal shoulder, Ger, Drain bladder       Anesthesia    Method: Epidural           Delivery    Head delivery date/time: 3/11/2022 16:03:01   Delivery date/time:  3/11/22 16:03:43   Delivery type: Normal spontaneous vaginal delivery    Details:     Delivery location: delivery room  Delivery Room Temperature: 71     Delivery Providers    Delivering Clinician: Maria Eugenia Barrera MD   Delivery personnel:  Provider Role   Reagan Singh RN Baby Nurse   Flakito Cruz, RN Delivery Nurse         Cord    Vessels: 3 Vessels  Complications: None  Timed cord clamping: No  Cord blood disposition: to lab  Gases sent?: Yes     Resuscitation    Method: None      Measurements    Weight: 3960 g 8 lb 11.7 oz Length: 53 cm   Head circum. : 34 cm          Placenta    Date/time: 3/11/2022 1612  Removal: Spontaneous  Appearance: Intact  Disposition: Pathology, Lab     Apgars    Living status: Living   Apgar Scoring Key:    0 1 2    Skin color Blue or pale Acrocyanotic Completely pink Heart rate Absent <100 bpm >100 bpm    Reflex irritability No response Grimace Cry or active withdrawal    Muscle tone Limp Some flexion Active motion    Respiratory effort Absent Weak cry; hypoventilation Good, crying              1 Minute:  5 Minute:  10 Minute:  15 Minute:  20 Minute:    Skin color: 0  1       Heart rate: 2  2       Reflex irritablity: 2  2       Muscle tone: 2  2       Respiratory effort: 2  2       Total: 8  9          Apgars assigned by: Carter Garcia   disposition: with mother     Skin to Skin    Skin to skin initiated date/time: 3/11/2022  Skin to skin with: Mother     Vaginal Count    Initial count RN: Michael Garnica RN  Initial count Tech: [place] Person   Sponges   Sharps    Initial counts 10   0    Final counts 10   1    Final count RN: Michael Garnica RN  Final count MD: Yessi Bello MD     Delivery (Maternal)    Episiotomy: None  Perineal lacerations: 2nd Repaired?: Yes   Vaginal laceration?: No    Cervical laceration?: No    Clitoral laceration?: No Repaired?: No   Quantitative blood loss (mL): [place]   Vaginal Delivery Procedure Note      [facility] Patient Status:  Inpatient    1990 MRN [id number]   Location [address] [place] [address] Attending Yessi Bello MD   Hosp Day # 0 PCP Neo Rodrigez MD     Date of Delivery: 22    Pre procedure diagnosis:  IUP at Term, post dates pregnancy    Post procedure diagnosis: Same, delivered    Procedure: Normal Spontaneous Vaginal Delivery    Attending: Dr. Mike Everett MD      Anesthesia: Epidural    EBL:  400 cc  QBL: 540 cc     Indications:  Patient is a [address] old  at 44w3d who presented for induction of labor, she progressed to complete cervical dilation with pitocin augmentation. Findings:    Sex: female     Weight:3960g      Apgars: 8/9      Lacerations: 2nd degree perineal laceration, no periurethral, no cervical       Procedure:   The patient was confirmed to be completely dilated. The patients was placed in the dorsolithotomy position. She was then encouraged to push. As the head was delivered in [place] position, the perineum was supported to decrease the risk of tearing. The shoulders did not deliver. Shoulder dystocia was called. Ger maneuver did not deliver the shoulders. The left arm and shoulder was delivered with a posterior arm delivery maneuver and the baby delivered. Total shoulder dystocia time was 42 seconds. The baby gave a cry initially and was placed on the mothers abdomen for the cord to be cut. The cord was cut then the baby was brought to the warmer for neonatology RN evaluation and neonatologist evaluation. IV pitocin bolus was initiated. A segment of the umbilical cord was obtained for cord gas analysis. The cord blood was sampled. The placenta then delivered intact. The uterus was noted to be firm. Good hemostasis was noted. The perineum, vaginal mucosa and cervix was then examined. A 2nd degree perineal laceration repaired with 2-0 vicryl. Bleeding was moderate and Cytotec per rectum and IM methergine were given. Bleeding was then minimal.  The patient was then moved to the supine position in stable condition. Sponge, needle, and instrument counts were correct. Complications:  None     Mother and infant in good condition in the delivery room.     Dr. Eleazar Sanchez MD    [facility]

## 2022-03-11 NOTE — ANESTHESIA PROCEDURE NOTES
Labor Analgesia    Date/Time: 3/11/2022 12:18 PM  Performed by: Reyna Walton MD  Authorized by: Reyna Walton MD       General Information and Staff    Start Time:  3/11/2022 12:16 PM  End Time:  3/11/2022 12:18 PM  Anesthesiologist:  Reyna Walton MD  Performed by:   Anesthesiologist  Patient Location:  OB  Site Identification: surface landmarks    Reason for Block: labor epidural    Preanesthetic Checklist: patient identified, IV checked, site marked, risks and benefits discussed, monitors and equipment checked, pre-op evaluation, timeout performed, anesthesia consent and sterile technique used      Procedure Details    Patient Position:  Sitting  Prep: ChloraPrep    Monitoring:  Heart rate  Approach:  Midline    Epidural Needle    Injection Technique:  ROBYN saline  Needle Type:  Tuohy  Needle Gauge:  18 G  Needle Length:  3.5 in  Needle Insertion Depth:  7  Location:  L2-3    Spinal Needle    Needle Type:  Sprotte tip  Needle Gauge:  27 G    Catheter    Catheter Type:  Multi-orifice  Catheter Size:  20 G  Catheter at Skin Depth:  12  Test Dose:  Negative    Assessment  Sensory Level:  T10    Additional Comments

## 2022-03-11 NOTE — PLAN OF CARE
Problem: Patient Centered Care  Goal: Patient preferences are identified and integrated in the patient's plan of care  Description: Interventions:  - What would you like us to know as we care for you?   - Provide timely, complete, and accurate information to patient/family  - Incorporate patient and family knowledge, values, beliefs, and cultural backgrounds into the planning and delivery of care  - Encourage patient/family to participate in care and decision-making at the level they choose  - Honor patient and family perspectives and choices  Outcome: Progressing     Problem: Patient/Family Goals  Goal: Patient/Family Long Term Goal  Description: Patient's Long Term Goal: uncomplicated vaginal delivery    Interventions:  - epidural  - See additional Care Plan goals for specific interventions  Outcome: Progressing  Goal: Patient/Family Short Term Goal  Description: Patient's Short Term Goal: Pain Controlled    Interventions:   - epidural  - See additional Care Plan goals for specific interventions  Outcome: Progressing     Problem: BIRTH - VAGINAL/ SECTION  Goal: Fetal and maternal status remain reassuring during the birth process  Description: INTERVENTIONS:  - Monitor vital signs  - Monitor fetal heart rate  - Monitor uterine activity  - Monitor labor progression (vaginal delivery)  - DVT prophylaxis (C/S delivery)  - Surgical antibiotic prophylaxis (C/S delivery)  Outcome: Progressing     Problem: PAIN - ADULT  Goal: Verbalizes/displays adequate comfort level or patient's stated pain goal  Description: INTERVENTIONS:  - Encourage pt to monitor pain and request assistance  - Assess pain using appropriate pain scale  - Administer analgesics based on type and severity of pain and evaluate response  - Implement non-pharmacological measures as appropriate and evaluate response  - Consider cultural and social influences on pain and pain management  - Manage/alleviate anxiety  - Utilize distraction and/or relaxation techniques  - Monitor for opioid side effects  - Notify MD/LIP if interventions unsuccessful or patient reports new pain  - Anticipate increased pain with activity and pre-medicate as appropriate  Outcome: Progressing     Problem: ANXIETY  Goal: Will report anxiety at manageable levels  Description: INTERVENTIONS:  - Administer medication as ordered  - Teach and rehearse alternative coping skills  - Provide emotional support with 1:1 interaction with staff  Outcome: Progressing

## 2022-03-11 NOTE — PROGRESS NOTES
Pt is a 28year old female admitted to LD/LDR3-A. Patient presents for scheduled induction of labor     Pt is  40w3d intra-uterine pregnancy. History obtained, consents signed. Oriented to room, staff, and plan of care.

## 2022-03-12 PROBLEM — Z87.59 HISTORY OF SHOULDER DYSTOCIA IN PRIOR PREGNANCY: Status: ACTIVE | Noted: 2022-03-12

## 2022-03-12 LAB
ALBUMIN SERPL-MCNC: 2.1 G/DL (ref 3.4–5)
ALBUMIN/GLOB SERPL: 0.6 {RATIO} (ref 1–2)
ALP LIVER SERPL-CCNC: 122 U/L
ALT SERPL-CCNC: 15 U/L
ANION GAP SERPL CALC-SCNC: 8 MMOL/L (ref 0–18)
AST SERPL-CCNC: 26 U/L (ref 15–37)
BASOPHILS # BLD AUTO: 0.04 X10(3) UL (ref 0–0.2)
BASOPHILS NFR BLD AUTO: 0.3 %
BILIRUB SERPL-MCNC: 0.4 MG/DL (ref 0.1–2)
BUN BLD-MCNC: 7 MG/DL (ref 7–18)
BUN/CREAT SERPL: 14.6 (ref 10–20)
CALCIUM BLD-MCNC: 7.5 MG/DL (ref 8.5–10.1)
CHLORIDE SERPL-SCNC: 109 MMOL/L (ref 98–112)
CREAT BLD-MCNC: 0.48 MG/DL
DEPRECATED RDW RBC AUTO: 42.4 FL (ref 35.1–46.3)
EOSINOPHIL # BLD AUTO: 0.08 X10(3) UL (ref 0–0.7)
EOSINOPHIL NFR BLD AUTO: 0.6 %
ERYTHROCYTE [DISTWIDTH] IN BLOOD BY AUTOMATED COUNT: 14.6 % (ref 11–15)
GLOBULIN PLAS-MCNC: 3.4 G/DL (ref 2.8–4.4)
GLUCOSE BLD-MCNC: 90 MG/DL (ref 70–99)
GLUCOSE BLDC GLUCOMTR-MCNC: 82 MG/DL (ref 70–99)
GLUCOSE BLDC GLUCOMTR-MCNC: 88 MG/DL (ref 70–99)
HCT VFR BLD AUTO: 26.7 %
HGB BLD-MCNC: 8.5 G/DL
IMM GRANULOCYTES # BLD AUTO: 0.06 X10(3) UL (ref 0–1)
IMM GRANULOCYTES NFR BLD: 0.4 %
INR BLD: 1.07 (ref 0.8–1.2)
INR BLD: 1.08 (ref 0.8–1.2)
LYMPHOCYTES # BLD AUTO: 2.03 X10(3) UL (ref 1–4)
LYMPHOCYTES NFR BLD AUTO: 15.2 %
MAGNESIUM SERPL-MCNC: 4.6 MG/DL (ref 4.8–8.4)
MAGNESIUM SERPL-MCNC: 5 MG/DL (ref 4.8–8.4)
MAGNESIUM SERPL-MCNC: 5.2 MG/DL (ref 4.8–8.4)
MAGNESIUM SERPL-MCNC: 5.4 MG/DL (ref 4.8–8.4)
MCH RBC QN AUTO: 25.7 PG (ref 26–34)
MCHC RBC AUTO-ENTMCNC: 31.8 G/DL (ref 31–37)
MCV RBC AUTO: 80.7 FL
MONOCYTES # BLD AUTO: 0.9 X10(3) UL (ref 0.1–1)
MONOCYTES NFR BLD AUTO: 6.7 %
NEUTROPHILS # BLD AUTO: 10.27 X10 (3) UL (ref 1.5–7.7)
NEUTROPHILS # BLD AUTO: 10.27 X10(3) UL (ref 1.5–7.7)
NEUTROPHILS NFR BLD AUTO: 76.8 %
OSMOLALITY SERPL CALC.SUM OF ELEC: 288 MOSM/KG (ref 275–295)
PLATELET # BLD AUTO: 304 10(3)UL (ref 150–450)
POTASSIUM SERPL-SCNC: 3.8 MMOL/L (ref 3.5–5.1)
PROT SERPL-MCNC: 5.5 G/DL (ref 6.4–8.2)
PROTHROMBIN TIME: 14 SECONDS (ref 11.6–14.8)
PROTHROMBIN TIME: 14.2 SECONDS (ref 11.6–14.8)
RBC # BLD AUTO: 3.31 X10(6)UL
SODIUM SERPL-SCNC: 140 MMOL/L (ref 136–145)
WBC # BLD AUTO: 13.4 X10(3) UL (ref 4–11)

## 2022-03-12 NOTE — PROGRESS NOTES
1623  Cytotec given rectally by Dr Anthony Russell started recovery. 540ml delivery QBL. Methergine given IM    1650 vaginal bleeding 195ml with a clot expelled. Fundal massage. Continued to monitor bleeding. Pt is breastfeeding. 1701 Patients mother came out of room and said her daughter was shaking.  put on emergency light    1702 RN,  Assessed patient, Vaginal bleeding noted, Pt was not shaking. Pt appeared pale in color, Pt seemed very groggy, responded correctly to directions. Removed baby from Mothers breast, Handed baby to grandma. 1704  Vaginal bleed increased, Called Dr Irma Guzman to assess. Delayed response to verbal stimuli. Pulse ox 100, heart rate  69. Fundal massage. 1706  Vaginal bleeding increased, fundus massage, clots expelled. Pt very weak, slow to respond to verbal requests. OB emergency  Rapid response initiated. Vitals Stable. Yarelis Gotti, Rocio Sean responded immediately  1784  Dr Irma Guzman at bedside to assess. Continued to monitor vaginal bleeding. Iv bolus given. 46  Dr Juan Antony arrived  [place] [address] Dr Mannie Murphy arrived  [id number]  145ml,190ml, & 235ml expressed, retrieved by dr Irma Guzman. Second IV line started in left forearm by lalito,  Labs drawn by Cecilia SHAW, Hemabate, TXA given, Pitocin continues. 1721  Expressed 125ml , Dr Irma Guzman remains at bedside. Dr Irma Guzman ordered Magnesium. /104, Pt understanding Dr Ericka Arnett commands. 1723  4 gram bolus magnesium started. Continued to monitor vaginal bleeding. Dr Irma Guzman remains at bedside. Pt less groggy, more alert at this time. Pt began to vomit clear liquid. 1732 Continued to monitor vaginal bleeding, and pt status. Pt alert and oriented, Pericare. 150ml blood loss    1740  Vaginal bleeding 60,  Pt stable, bleeding undercontrolled  Dr Irma Guzman assesed. 1800 Pt stable,  RN will continue to monitor vaginal bleeding and VS.  Barnes inserted, SCD's applied. pulse ox remains in place.

## 2022-03-12 NOTE — ANESTHESIA POSTPROCEDURE EVALUATION
Patient: Orlando Masters    Procedure Summary     Date: 03/11/22 Room / Location:     Anesthesia Start: 9412 Anesthesia Stop: 1612    Procedure: LABOR ANALGESIA Diagnosis:     Scheduled Providers:  Anesthesiologist: Juan Manuel Rosas MD    Anesthesia Type: epidural ASA Status: 3          Anesthesia Type: epidural    Vitals Value Taken Time   /79 03/11/22 1831   Temp 97.8 03/11/22 1840   Pulse 78 03/11/22 1835   Resp 16 03/11/22 1840   SpO2 99 % 03/11/22 1835   Vitals shown include unvalidated device data.     [facility] Evaluation:   Patient Evaluated in floor  Patient Participation: complete - patient participated  Level of Consciousness: awake and alert  Pain Score: 0  Pain Management: adequate  Airway Patency:patent  Yes    Cardiovascular Status: acceptable and stable  Respiratory Status: acceptable  Postoperative Hydration acceptable      Watson Meyers MD  3/11/2022 6:40 PM

## 2022-03-12 NOTE — PLAN OF CARE
Problem: Patient Centered Care  Goal: Patient preferences are identified and integrated in the patient's plan of care  Description: Interventions:  - What would you like us to know as we care for you?   - Provide timely, complete, and accurate information to patient/family  - Incorporate patient and family knowledge, values, beliefs, and cultural backgrounds into the planning and delivery of care  - Encourage patient/family to participate in care and decision-making at the level they choose  - Honor patient and family perspectives and choices  Outcome: Completed     Problem: Patient/Family Goals  Goal: Patient/Family Long Term Goal  Description: Patient's Long Term Goal:     Interventions:  -   - See additional Care Plan goals for specific interventions  Outcome: Completed  Goal: Patient/Family Short Term Goal  Description: Patient's Short Term Goal:    Interventions:   -   - See additional Care Plan goals for specific interventions  Outcome: Completed     Problem: BIRTH - VAGINAL/ SECTION  Goal: Fetal and maternal status remain reassuring during the birth process  Description: INTERVENTIONS:  - Monitor vital signs  - Monitor fetal heart rate  - Monitor uterine activity  - Monitor labor progression (vaginal delivery)  - DVT prophylaxis (C/S delivery)  - Surgical antibiotic prophylaxis (C/S delivery)  Outcome: Completed     Problem: PAIN - ADULT  Goal: Verbalizes/displays adequate comfort level or patient's stated pain goal  Description: INTERVENTIONS:  - Encourage pt to monitor pain and request assistance  - Assess pain using appropriate pain scale  - Administer analgesics based on type and severity of pain and evaluate response  - Implement non-pharmacological measures as appropriate and evaluate response  - Consider cultural and social influences on pain and pain management  - Manage/alleviate anxiety  - Utilize distraction and/or relaxation techniques  - Monitor for opioid side effects  - Notify MD/LIP if interventions unsuccessful or patient reports new pain  - Anticipate increased pain with activity and pre-medicate as appropriate  Outcome: Completed     Problem: ANXIETY  Goal: Will report anxiety at manageable levels  Description: INTERVENTIONS:  - Administer medication as ordered  - Teach and rehearse alternative coping skills  - Provide emotional support with 1:1 interaction with staff  Outcome: Completed

## 2022-03-12 NOTE — PROGRESS NOTES
Labor Progress Note  Subjective:   Called to patients room for rapid response. Pt unconscious initially then answering questions with significant fatigue. Objective:    22  1745   BP: 135/71   Pulse: 87   Resp:    Temp:       Bimanual exam performed due to increased bleeding:   QBL total with delivery 540 ml. Postpartum hemorrhage QBL 1210 ml   Total 1750 ml     Assessment and plan:   Patient is a 28year old  s/p  with 2nd degree now with postpartum hemorrhage and possible eclampsia      PPH possible eclampsia    -Rapid response    -TXA, Hemabate, and pitocin given due to bleeding.   - Accu check 79.   - BP noted to be normal previously and now diastolic's in the 080'F but less than 110.   - Given patients family stating she was shaking and now fatigued will co treat at Eclampsia. - Magnesium 4 gram mag bolus given simultaneously with hemorrhage medications. Will continue with 2 g per hour.   - Stat labs: CBC, fibrinogen and coags. - Bimanual exams performed with decreasing amounts. - Will place marquez. - Continue to monitor bleeding.   - Continue magnesium for 24 hours post placenta delivery. Plan of care discussed with the patient and family.      Dr. Maria Isabel Padilla MD    [facility]

## 2022-03-12 NOTE — PLAN OF CARE
Problem: POSTPARTUM  Goal: Long Term Goal:Experiences normal postpartum course  Description: INTERVENTIONS:  - Assess and monitor vital signs and lab values. - Assess fundus and lochia. - Provide ice/sitz baths for perineum discomfort. - Monitor healing of incision/episiotomy/laceration, and assess for signs and symptoms of infection and hematoma. - Assess bladder function and monitor for bladder distention.  - Provide/instruct/assist with pericare as needed. - Provide VTE prophylaxis as needed. - Monitor bowel function.  - Encourage ambulation and provide assistance as needed. - Assess and monitor emotional status and provide social service/psych resources as needed. - Utilize standard precautions and use personal protective equipment as indicated. Ensure aseptic care of all intravenous lines and invasive tubes/drains.  - Obtain immunization and exposure to communicable diseases history. Outcome: Progressing  Goal: Appropriate maternal -  bonding  Description: INTERVENTIONS:  - Assess caregiver- interactions. - Assess caregiver's emotional status and coping mechanisms. - Encourage caregiver to participate in  daily care. - Assess support systems available to mother/family.  - Provide /case management support as needed.   Outcome: Progressing

## 2022-03-12 NOTE — PROGRESS NOTES
Late entry    Pt noted to have some rubra on chux. Weight of chux at this time 110 grams. Dr. Irma Guzman at bedside and assessing pt's bleeding. Fundus firm. Per Dr. Maurizio Ling, no ADAM needed at this time. Verbal order received for RN to administer another dose of hemabate 250 mcg x1 IM and 2 mg loperamide PO now at this time and QID PRN. Per Dr. Irma Guzman, pt can have a general diet in a couple of hours. Verbal order received for pt to have a marquez catheter placed. No additional orders received at this time.

## 2022-03-13 VITALS
HEIGHT: 62 IN | WEIGHT: 236 LBS | RESPIRATION RATE: 18 BRPM | DIASTOLIC BLOOD PRESSURE: 61 MMHG | BODY MASS INDEX: 43.43 KG/M2 | OXYGEN SATURATION: 98 % | SYSTOLIC BLOOD PRESSURE: 102 MMHG | TEMPERATURE: 98 F | HEART RATE: 76 BPM

## 2022-03-13 RX ORDER — IBUPROFEN 600 MG/1
600 TABLET ORAL EVERY 6 HOURS PRN
Qty: 60 TABLET | Refills: 0 | Status: SHIPPED | OUTPATIENT
Start: 2022-03-13

## 2022-03-13 RX ORDER — ACETAMINOPHEN 325 MG/1
325 TABLET ORAL EVERY 6 HOURS PRN
Qty: 60 TABLET | Refills: 0 | Status: SHIPPED | OUTPATIENT
Start: 2022-03-13

## 2022-03-13 RX ORDER — DOCUSATE SODIUM 100 MG/1
100 CAPSULE, LIQUID FILLED ORAL 2 TIMES DAILY PRN
Qty: 60 CAPSULE | Refills: 0 | Status: SHIPPED | OUTPATIENT
Start: 2022-03-13 | End: 2022-04-12

## 2022-03-13 NOTE — PLAN OF CARE

## 2022-03-13 NOTE — PROGRESS NOTES
Patient up to bathroom with assist x 2. Voided 200 mL. Patient transferred to mother/baby room 356 per wheelchair in stable condition with baby and personal belongings. Accompanied by significant other and staff. Report given to Leander Wren mother/baby RN.

## 2022-03-14 ENCOUNTER — PATIENT OUTREACH (OUTPATIENT)
Dept: CASE MANAGEMENT | Age: 32
End: 2022-03-14

## 2022-03-14 NOTE — PROGRESS NOTES
1st attempt OBGYN Post Partum apt request  (delivered 03/11)    Dr Henry Rios [address] [place]  Follow up 6 weeks  Apt made:   Wed 04/27 @5:30pm  Confirmed w/pt  Closing encounter

## 2022-03-24 ENCOUNTER — TELEPHONE (OUTPATIENT)
Dept: OBGYN UNIT | Facility: HOSPITAL | Age: 32
End: 2022-03-24

## 2022-04-26 ENCOUNTER — POSTPARTUM (OUTPATIENT)
Dept: OBGYN CLINIC | Facility: CLINIC | Age: 32
End: 2022-04-26
Payer: COMMERCIAL

## 2022-04-26 VITALS
DIASTOLIC BLOOD PRESSURE: 72 MMHG | BODY MASS INDEX: 40.23 KG/M2 | HEIGHT: 62 IN | WEIGHT: 218.63 LBS | SYSTOLIC BLOOD PRESSURE: 134 MMHG

## 2022-04-26 DIAGNOSIS — Z30.430 ENCOUNTER FOR IUD INSERTION: ICD-10-CM

## 2022-04-26 LAB
CONTROL LINE PRESENT WITH A CLEAR BACKGROUND (YES/NO): YES YES/NO
PREGNANCY TEST, URINE: NEGATIVE

## 2022-04-26 PROCEDURE — 3008F BODY MASS INDEX DOCD: CPT | Performed by: OBSTETRICS & GYNECOLOGY

## 2022-04-26 PROCEDURE — 3078F DIAST BP <80 MM HG: CPT | Performed by: OBSTETRICS & GYNECOLOGY

## 2022-04-26 PROCEDURE — 3075F SYST BP GE 130 - 139MM HG: CPT | Performed by: OBSTETRICS & GYNECOLOGY

## 2022-04-26 PROCEDURE — 58300 INSERT INTRAUTERINE DEVICE: CPT | Performed by: OBSTETRICS & GYNECOLOGY

## 2022-04-26 PROCEDURE — 81025 URINE PREGNANCY TEST: CPT | Performed by: OBSTETRICS & GYNECOLOGY

## 2022-04-27 ENCOUNTER — TELEPHONE (OUTPATIENT)
Dept: OBGYN CLINIC | Facility: CLINIC | Age: 32
End: 2022-04-27

## 2022-04-27 PROBLEM — R03.0 ELEVATED BLOOD PRESSURE READING: Status: RESOLVED | Noted: 2022-02-14 | Resolved: 2022-04-27

## 2022-04-27 PROBLEM — Z87.59 HISTORY OF SHOULDER DYSTOCIA IN PRIOR PREGNANCY: Status: RESOLVED | Noted: 2022-03-12 | Resolved: 2022-04-27

## 2022-05-17 ENCOUNTER — TELEPHONE (OUTPATIENT)
Dept: OBGYN CLINIC | Facility: CLINIC | Age: 32
End: 2022-05-17

## 2022-05-17 NOTE — TELEPHONE ENCOUNTER
Pt calling and stated that she has been on her cycle for three weeks and been light headed and dizzy and wan to know could be from her IUD

## 2022-05-17 NOTE — TELEPHONE ENCOUNTER
RN contacted pt. Pt reports that dizziness started a few days after IUD insertion 4/26/22. Pt reports ongoing bleeding as well, denies it being heavy enough to saturate pad/hr. Pt scheduled for visit with [address] [facility] 5/19, pt refused sooner appt. RN provided ED precautions. CBC order placed for pt. Pt to complete lab 5/19 at 11 before appt with . Pt verbalized understanding, agrees to [facility].

## 2022-05-19 ENCOUNTER — OFFICE VISIT (OUTPATIENT)
Dept: OBGYN CLINIC | Facility: CLINIC | Age: 32
End: 2022-05-19
Payer: COMMERCIAL

## 2022-05-19 ENCOUNTER — LAB ENCOUNTER (OUTPATIENT)
Dept: LAB | Age: 32
End: 2022-05-19
Attending: OBSTETRICS & GYNECOLOGY
Payer: COMMERCIAL

## 2022-05-19 VITALS — BODY MASS INDEX: 40 KG/M2 | SYSTOLIC BLOOD PRESSURE: 120 MMHG | DIASTOLIC BLOOD PRESSURE: 76 MMHG | HEIGHT: 62 IN

## 2022-05-19 DIAGNOSIS — Z30.431 IUD CHECK UP: Primary | ICD-10-CM

## 2022-05-19 LAB
BASOPHILS # BLD AUTO: 0.01 X10(3) UL (ref 0–0.2)
BASOPHILS NFR BLD AUTO: 0.1 %
DEPRECATED RDW RBC AUTO: 47.8 FL (ref 35.1–46.3)
EOSINOPHIL # BLD AUTO: 0.13 X10(3) UL (ref 0–0.7)
EOSINOPHIL NFR BLD AUTO: 1.8 %
ERYTHROCYTE [DISTWIDTH] IN BLOOD BY AUTOMATED COUNT: 15.7 % (ref 11–15)
HCT VFR BLD AUTO: 35.8 %
HGB BLD-MCNC: 10.7 G/DL
IMM GRANULOCYTES # BLD AUTO: 0.02 X10(3) UL (ref 0–1)
IMM GRANULOCYTES NFR BLD: 0.3 %
LYMPHOCYTES # BLD AUTO: 2.08 X10(3) UL (ref 1–4)
LYMPHOCYTES NFR BLD AUTO: 28.3 %
MCH RBC QN AUTO: 24.8 PG (ref 26–34)
MCHC RBC AUTO-ENTMCNC: 29.9 G/DL (ref 31–37)
MCV RBC AUTO: 83.1 FL
MONOCYTES # BLD AUTO: 0.27 X10(3) UL (ref 0.1–1)
MONOCYTES NFR BLD AUTO: 3.7 %
NEUTROPHILS # BLD AUTO: 4.84 X10 (3) UL (ref 1.5–7.7)
NEUTROPHILS # BLD AUTO: 4.84 X10(3) UL (ref 1.5–7.7)
NEUTROPHILS NFR BLD AUTO: 65.8 %
PLATELET # BLD AUTO: 428 10(3)UL (ref 150–450)
RBC # BLD AUTO: 4.31 X10(6)UL
WBC # BLD AUTO: 7.4 X10(3) UL (ref 4–11)

## 2022-05-19 PROCEDURE — 3074F SYST BP LT 130 MM HG: CPT | Performed by: OBSTETRICS & GYNECOLOGY

## 2022-05-19 PROCEDURE — 85025 COMPLETE CBC W/AUTO DIFF WBC: CPT

## 2022-05-19 PROCEDURE — 99213 OFFICE O/P EST LOW 20 MIN: CPT | Performed by: OBSTETRICS & GYNECOLOGY

## 2022-05-19 PROCEDURE — 3078F DIAST BP <80 MM HG: CPT | Performed by: OBSTETRICS & GYNECOLOGY

## 2022-05-19 PROCEDURE — 36415 COLL VENOUS BLD VENIPUNCTURE: CPT

## 2022-05-19 RX ORDER — LEVONORGESTREL 52 MG/1
INTRAUTERINE DEVICE INTRAUTERINE
COMMUNITY

## 2022-05-26 ENCOUNTER — TELEPHONE (OUTPATIENT)
Dept: OBGYN CLINIC | Facility: CLINIC | Age: 32
End: 2022-05-26

## 2022-05-26 NOTE — TELEPHONE ENCOUNTER
Pt saw PCP about  Anemia per LC. But still having the symptoms even after PCP prescribed medication for the anemia.   Pt want to know can the IUD be causing the not being able to focus and light headedness stated this didn't start until after getting the IUD

## 2022-06-08 ENCOUNTER — ULTRASOUND ENCOUNTER (OUTPATIENT)
Dept: OBGYN CLINIC | Facility: CLINIC | Age: 32
End: 2022-06-08
Payer: COMMERCIAL

## 2022-06-08 DIAGNOSIS — Z30.431 IUD CHECK UP: ICD-10-CM

## 2022-06-17 ENCOUNTER — TELEPHONE (OUTPATIENT)
Dept: OBGYN CLINIC | Facility: CLINIC | Age: 32
End: 2022-06-17

## 2022-06-17 NOTE — TELEPHONE ENCOUNTER
RN spoke with pt. Pt c/o heavy bleeding since last night. Pt states that she was saturating a regular tampon every 50 min, so she switched to pads. Pt states that she is saturating 1 pad every 90 min. Pt reports occasional dizziness, for which she has previously seen [address] [facility] [facility] [address]. Pt had an IUD placed in April and reports spotting and bleeding ever since. RN told pt that irregular bleeding can occur for 90 days post-IUD insertion. RN asked pt if she followed up with her PCP re: dizziness as directed by [address] in May. Pt said that she saw her PCP last week and that he ordered more blood work. Pt had blood drawn this morning, but she is unaware of which labs were performed. Pt states that she started taking an iron supplement a few weeks ago, and she has been feeling better and less dizzy since then. RN advised pt to follow up with PCP re: today's lab results and occasional dizziness. RN told pt that if she begins saturating 1 pad per hour, she should call office and go to the ER. Pt verbalized understanding and agreed with POC.

## 2022-06-17 NOTE — TELEPHONE ENCOUNTER
Pt stated she is bleeding and has to stop wearing the tampon because she is bleeding through it and she has a IUD

## 2022-07-26 ENCOUNTER — OFFICE VISIT (OUTPATIENT)
Dept: OBGYN CLINIC | Facility: CLINIC | Age: 32
End: 2022-07-26
Payer: COMMERCIAL

## 2022-07-26 ENCOUNTER — TELEPHONE (OUTPATIENT)
Dept: OBGYN CLINIC | Facility: CLINIC | Age: 32
End: 2022-07-26

## 2022-07-26 VITALS — DIASTOLIC BLOOD PRESSURE: 80 MMHG | HEIGHT: 62 IN | SYSTOLIC BLOOD PRESSURE: 120 MMHG | BODY MASS INDEX: 40 KG/M2

## 2022-07-26 DIAGNOSIS — N76.0 VAGINITIS AND VULVOVAGINITIS: Primary | ICD-10-CM

## 2022-07-26 DIAGNOSIS — R30.0 DYSURIA: ICD-10-CM

## 2022-07-26 PROBLEM — O99.820 GROUP B STREPTOCOCCUS CARRIER, ANTEPARTUM (HCC): Status: RESOLVED | Noted: 2022-02-22 | Resolved: 2022-07-26

## 2022-07-26 PROBLEM — O48.0 POST-DATES PREGNANCY (HCC): Status: RESOLVED | Noted: 2022-03-11 | Resolved: 2022-07-26

## 2022-07-26 PROBLEM — O24.410 DIET CONTROLLED GESTATIONAL DIABETES MELLITUS (GDM) IN THIRD TRIMESTER: Chronic | Status: RESOLVED | Noted: 2021-09-18 | Resolved: 2022-07-26

## 2022-07-26 PROBLEM — O24.410 DIET CONTROLLED GESTATIONAL DIABETES MELLITUS (GDM) IN THIRD TRIMESTER (HCC): Chronic | Status: RESOLVED | Noted: 2021-09-18 | Resolved: 2022-07-26

## 2022-07-26 PROBLEM — O48.0 POST-DATES PREGNANCY: Status: RESOLVED | Noted: 2022-03-11 | Resolved: 2022-07-26

## 2022-07-26 PROBLEM — O14.90 PREECLAMPSIA (HCC): Status: RESOLVED | Noted: 2022-03-11 | Resolved: 2022-07-26

## 2022-07-26 PROBLEM — O99.820 GROUP B STREPTOCOCCUS CARRIER, ANTEPARTUM: Status: RESOLVED | Noted: 2022-02-22 | Resolved: 2022-07-26

## 2022-07-26 PROBLEM — O14.90 PREECLAMPSIA: Status: RESOLVED | Noted: 2022-03-11 | Resolved: 2022-07-26

## 2022-07-26 PROBLEM — Z34.83 MULTIGRAVIDA IN THIRD TRIMESTER: Status: RESOLVED | Noted: 2021-09-01 | Resolved: 2022-07-26

## 2022-07-26 PROBLEM — Z34.83 MULTIGRAVIDA IN THIRD TRIMESTER (HCC): Status: RESOLVED | Noted: 2021-09-01 | Resolved: 2022-07-26

## 2022-07-26 LAB
BILIRUBIN: NEGATIVE
GLUCOSE (URINE DIPSTICK): NEGATIVE MG/DL
KETONES (URINE DIPSTICK): NEGATIVE MG/DL
LEUKOCYTES: NEGATIVE
MULTISTIX LOT#: ABNORMAL NUMERIC
NITRITE, URINE: NEGATIVE
PH, URINE: 5.5 (ref 4.5–8)
PROTEIN (URINE DIPSTICK): NEGATIVE MG/DL
SPECIFIC GRAVITY: >=1.03 (ref 1–1.03)
URINE-COLOR: YELLOW
UROBILINOGEN,SEMI-QN: 0.2 MG/DL (ref 0–1.9)

## 2022-07-26 PROCEDURE — 87808 TRICHOMONAS ASSAY W/OPTIC: CPT | Performed by: OBSTETRICS & GYNECOLOGY

## 2022-07-26 PROCEDURE — 87106 FUNGI IDENTIFICATION YEAST: CPT | Performed by: OBSTETRICS & GYNECOLOGY

## 2022-07-26 PROCEDURE — 87086 URINE CULTURE/COLONY COUNT: CPT | Performed by: OBSTETRICS & GYNECOLOGY

## 2022-07-26 PROCEDURE — 81002 URINALYSIS NONAUTO W/O SCOPE: CPT | Performed by: OBSTETRICS & GYNECOLOGY

## 2022-07-26 PROCEDURE — 3074F SYST BP LT 130 MM HG: CPT | Performed by: OBSTETRICS & GYNECOLOGY

## 2022-07-26 PROCEDURE — 3079F DIAST BP 80-89 MM HG: CPT | Performed by: OBSTETRICS & GYNECOLOGY

## 2022-07-26 PROCEDURE — 87205 SMEAR GRAM STAIN: CPT | Performed by: OBSTETRICS & GYNECOLOGY

## 2022-07-26 PROCEDURE — 99213 OFFICE O/P EST LOW 20 MIN: CPT | Performed by: OBSTETRICS & GYNECOLOGY

## 2022-07-26 RX ORDER — METRONIDAZOLE 500 MG/1
500 TABLET ORAL 2 TIMES DAILY
Qty: 14 TABLET | Refills: 1 | Status: SHIPPED | OUTPATIENT
Start: 2022-07-26 | End: 2022-08-02

## 2022-07-26 NOTE — TELEPHONE ENCOUNTER
Called pt and c/o UTI s/s itching, foul odor, mucous vaginally, informed pt more vaginal issue than UTI therefore scheduled today with RD. Pt agrees.

## 2022-07-28 LAB — TRICHOMONAS SCREEN: NEGATIVE

## 2022-09-14 ENCOUNTER — TELEPHONE (OUTPATIENT)
Dept: OBGYN CLINIC | Facility: CLINIC | Age: 32
End: 2022-09-14

## 2022-09-14 NOTE — TELEPHONE ENCOUNTER
RN spoke with pt. Pt c/o yeast symptoms: itching, foul odor, clear, white discharge, occasional cramps x 1 week. Pt reports slight burning with urination, but denies frequency, urgency. Pt is unable to come to the office before 5:00, and she sked an appt with EB for 9/21. RN recommended that pt use OTC Monistat to see if her symptoms improve. Pt would also like to speak to EB about her IUD options. RN told pt that she could address both the possible yeast and IUD at her appt next week. Pt verbalized understanding and agreed with POC.

## 2022-09-14 NOTE — TELEPHONE ENCOUNTER
The patient is having discomfort in her vaginal area. She is having itching,odor and white discharge. She stated she is not sure if it's a UTI or an infection.

## 2022-09-21 ENCOUNTER — OFFICE VISIT (OUTPATIENT)
Dept: OBGYN CLINIC | Facility: CLINIC | Age: 32
End: 2022-09-21

## 2022-09-21 VITALS
BODY MASS INDEX: 41.64 KG/M2 | WEIGHT: 226.31 LBS | DIASTOLIC BLOOD PRESSURE: 70 MMHG | HEIGHT: 62 IN | SYSTOLIC BLOOD PRESSURE: 128 MMHG

## 2022-09-21 DIAGNOSIS — Z97.5 PRESENCE OF IUD: Primary | ICD-10-CM

## 2022-09-21 DIAGNOSIS — N89.8 VAGINAL IRRITATION: ICD-10-CM

## 2022-09-21 DIAGNOSIS — Z11.3 SCREENING EXAMINATION FOR VENEREAL DISEASE: ICD-10-CM

## 2022-09-21 PROCEDURE — 87491 CHLMYD TRACH DNA AMP PROBE: CPT | Performed by: OBSTETRICS & GYNECOLOGY

## 2022-09-21 PROCEDURE — 87205 SMEAR GRAM STAIN: CPT | Performed by: OBSTETRICS & GYNECOLOGY

## 2022-09-21 PROCEDURE — 3078F DIAST BP <80 MM HG: CPT | Performed by: OBSTETRICS & GYNECOLOGY

## 2022-09-21 PROCEDURE — 87106 FUNGI IDENTIFICATION YEAST: CPT | Performed by: OBSTETRICS & GYNECOLOGY

## 2022-09-21 PROCEDURE — 3008F BODY MASS INDEX DOCD: CPT | Performed by: OBSTETRICS & GYNECOLOGY

## 2022-09-21 PROCEDURE — 87591 N.GONORRHOEAE DNA AMP PROB: CPT | Performed by: OBSTETRICS & GYNECOLOGY

## 2022-09-21 PROCEDURE — 3074F SYST BP LT 130 MM HG: CPT | Performed by: OBSTETRICS & GYNECOLOGY

## 2022-09-21 PROCEDURE — 99213 OFFICE O/P EST LOW 20 MIN: CPT | Performed by: OBSTETRICS & GYNECOLOGY

## 2022-09-21 PROCEDURE — 87808 TRICHOMONAS ASSAY W/OPTIC: CPT | Performed by: OBSTETRICS & GYNECOLOGY

## 2022-09-22 LAB
C TRACH DNA SPEC QL NAA+PROBE: NEGATIVE
N GONORRHOEA DNA SPEC QL NAA+PROBE: NEGATIVE

## 2022-09-23 LAB
GENITAL VAGINOSIS SCREEN: NEGATIVE
TRICHOMONAS SCREEN: NEGATIVE

## 2024-08-20 ENCOUNTER — OFFICE VISIT (OUTPATIENT)
Dept: OBGYN CLINIC | Facility: CLINIC | Age: 34
End: 2024-08-20
Payer: COMMERCIAL

## 2024-08-20 VITALS
HEIGHT: 62 IN | BODY MASS INDEX: 40.76 KG/M2 | WEIGHT: 221.5 LBS | SYSTOLIC BLOOD PRESSURE: 120 MMHG | DIASTOLIC BLOOD PRESSURE: 60 MMHG

## 2024-08-20 DIAGNOSIS — Z97.5 PRESENCE OF MIRENA IUD: ICD-10-CM

## 2024-08-20 DIAGNOSIS — Z12.4 ROUTINE CERVICAL SMEAR: Primary | ICD-10-CM

## 2024-08-20 DIAGNOSIS — Z01.419 WOMEN'S ANNUAL ROUTINE GYNECOLOGICAL EXAMINATION: ICD-10-CM

## 2024-08-20 LAB
CONTROL LINE PRESENT WITH A CLEAR BACKGROUND (YES/NO): YES YES/NO
KIT LOT #: NORMAL NUMERIC
PREGNANCY TEST, URINE: NEGATIVE

## 2024-08-20 PROCEDURE — 99213 OFFICE O/P EST LOW 20 MIN: CPT | Performed by: OBSTETRICS & GYNECOLOGY

## 2024-08-20 PROCEDURE — 99459 PELVIC EXAMINATION: CPT | Performed by: OBSTETRICS & GYNECOLOGY

## 2024-08-20 PROCEDURE — 87624 HPV HI-RISK TYP POOLED RSLT: CPT | Performed by: OBSTETRICS & GYNECOLOGY

## 2024-08-20 PROCEDURE — 3074F SYST BP LT 130 MM HG: CPT | Performed by: OBSTETRICS & GYNECOLOGY

## 2024-08-20 PROCEDURE — 99395 PREV VISIT EST AGE 18-39: CPT | Performed by: OBSTETRICS & GYNECOLOGY

## 2024-08-20 PROCEDURE — 3078F DIAST BP <80 MM HG: CPT | Performed by: OBSTETRICS & GYNECOLOGY

## 2024-08-20 PROCEDURE — 81025 URINE PREGNANCY TEST: CPT | Performed by: OBSTETRICS & GYNECOLOGY

## 2024-08-20 PROCEDURE — 3008F BODY MASS INDEX DOCD: CPT | Performed by: OBSTETRICS & GYNECOLOGY

## 2024-08-20 NOTE — PROGRESS NOTES
GYN ANNUAL    2024  5:07 PM    Chief Complaint   Patient presents with    Annual     Annual exam/pap    IUD     IUD check, Mirena Inserted 2022    Other     Period issues and bloating, LMP 2024, in office UHCG=Negative   .  HPI: Patient is a 34 year old  LMP 24 presents for annual gyn exam and PAP with concerns about recent changes in nature of periods on Mirena since 2022. Cycles irregular and occasionally heavier but no pain. Counseled that it is not unusual on IUDs to have unpredictable menstrual patterns but can check position with USN if string not visualized on today's exam. No other gynecologic issues or concerns.    OB History    Para Term  AB Living   2 2 2     2   SAB IAB Ectopic Multiple Live Births         0 2      # Outcome Date GA Lbr Teddy/2nd Weight Sex Type Anes PTL Lv   2 Term 22 40w3d 04:20 / 00:15 8 lb 11.7 oz (3.96 kg) F NORMAL SPONT EPI N BINTA      Complications: Shoulder Dystocia   1 Term 19   8 lb (3.629 kg) M NORMAL SPONT EPI  BINTA         GYN hx:    Hx Prior Abnormal Pap: No  Pap Date: 21  Pap Result Notes: per pt last pap done =wnl  CONTRACEPTION: Mirena IUD  LAST MAMMOGRAM: N/A      Current Outpatient Medications   Medication Sig Dispense Refill    Biotin 5000 MCG Oral Cap Take by mouth.      Levonorgestrel (MIRENA, 52 MG,) 20 MCG/DAY Intrauterine IUD by Intrauterine route.         Past Medical History:    Cyst, dermoid, arm, right    Diet controlled gestational diabetes mellitus (GDM) in third trimester (HCC)    Group B Streptococcus carrier, antepartum (HCC)    History of ankle surgery    Preeclampsia ([facility])     Past Surgical History:   Procedure Laterality Date    Ankle fracture surgery Right 2016    right broken ankle     Insert intrauterine device  2018    After 1st child    Insert intrauterine device  2022    Mirena IUD    Other surgical history Right 2019    cyst removed right armpit    Remove intrauterine device       To have current child     No Known Allergies  Family History   Problem Relation Age of Onset    Diabetes Father     Hypertension Father     Diabetes Mother     No Known Problems Maternal Grandmother     Cancer Maternal Uncle         throat cancer    Breast Cancer Paternal Aunt     Breast Cancer Paternal Aunt     No Known Problems Son      Social History     Socioeconomic History    Marital status:    Occupational History    Occupation:    Tobacco Use    Smoking status: Never    Smokeless tobacco: Never   Vaping Use    Vaping status: Never Used   Substance and Sexual Activity    Alcohol use: Never    Drug use: Never    Sexual activity: Yes     Partners: Male     Birth control/protection: I.U.D.     Comment: Paragard IUD 2018     Social History     Social History Narrative    Live with  and son    No h/o abuse       ROS:     Review of Systems:  A comprehensive 10 point ROS was completed. All pertinent positives and negatives noted in the HPI        /60   Ht 62\"   Wt 221 lb 8 oz (100.5 kg)   LMP 07/04/2024 (Exact Date)   BMI 40.51 kg/m²     Exam:   GENERAL: well developed, well nourished, in no apparent distress  SKIN: no rashes, no lesions  HEENT: normal  LUNGS: respiration unlabored  CARDIOVASCULAR: no peripheral edema or varicosities, skin warm and dry  BREASTS: bilaterally nontender, no palpable masses, no nipple discharge, no skin changes, no axillary adenopathy  ABDOMEN: Soft, non distended; non tender, no masses  GYNE/:   External Genitalia: normal, no lesions, good perineal support  Urethra: meatus normal  Bladder: well supported  Vagina: normal mucosa, no lesions, no discharge   Uterus: normal size, mobile, nontender  Cervix: string seen in normal position at os, no lesions or bleeding  Adnexa: normal size, bilaterally nontender, no palpable masses  Cul-de-sac: normal  R/V: normal perineum, no hemorrhoids  EXTREMITIES: nontender without edema      A/P: Patient is 34 year  old female here for well-woman exam.     1. Women's annual routine gynecological examination  - PAP today    2. Presence of Mirena IUD  - Normal position      Total time spent = 20 minutes  >50% = face to face discussion and coordination of care        8/20/2024  Blanca Dugan MD

## (undated) NOTE — LETTER
Vijay Dee, :1990    CONSENT FOR PROCEDURE/SEDATION    1. I authorize the performance upon Vijay Dee  the following: Paragard IUD removal    2.  I authorize Dr. Chikis Soto MD (and whomever is designated as the doctor’s assistant) Witness: _________________________________________ Date:___________     Physician Signature: _______________________________ Date:___________

## (undated) NOTE — MR AVS SNAPSHOT
After Visit Summary   2/25/2021    Nuno Green    MRN: [id number]           Visit Information     Date & Time  2/25/2021  8:15 AM Provider  Rose Dee MD Department  [address] [place] [address] Dept.  Phone  33 If you receive a survey from [employer], please take a few minutes to complete it and provide feedback. We strive to deliver the best patient experience and are looking for ways to make improvements. Your feedback will help us do so.  For more information EMERGENCY ROOM Life-threatening emergencies needing immediate intervention at a hospital emergency room.  Average cost  $2,300*   *Cost varies based on your insurance coverage  For more information about hours, locations or appointment options available at

## (undated) NOTE — LETTER
[facility]  Administration of Anesthesia  1. Kanchan Medley, or _________________________________ acting on her behalf, (Patient) (Dependent/Representative) request to receive anesthesia for my pending procedure/operation/treatment. A physician (anesthesiologist) alone or an anesthesiologist working with a nurse anesthetist may administer my anesthesia. 2. I understand that my anesthesiologist is not an employee or agent of the hospital, but is an independent medical practitioner who has been permitted to use its facilities for the care and treatment of his/her patients. 3. I acknowledge that a physician from [facility] or their designate(s), recommended anesthesia for me using her/his medical judgment. The type(s) of anesthesia I may receive include:                a) General Anesthesia, b) Spinal/Epidural Anesthesia, c) Regional Anesthesia or d) Monitored Anesthesia Care. 4. If my spinal, regional or monitored anesthesia care (local) is not satisfactory for my comfort, or if my medical condition requires, I consent to the administration of general anesthesia. 5. I am aware that the practice of anesthesiology is not an exact science and that some foreseeable risks or consequences may occur. Some common risks/consequences include sore throat and hoarseness, nausea and vomiting, muscle soreness, backache, damage to the mouth/teeth/vocal cords and eye injury. I understand that more rare but serious potential risks of anesthesia include blood pressure changes, drug reactions, cardiac arrest, brain damage, paralysis or death. These risks apply to whether I have general, spinal/epidural, regional or monitored anesthesia care. 6. OBSTETRIC PATIENTS: Specific risks/consequences of spinal/epidural anesthesia may include itching, low blood pressure, difficulty urinating, slowing of the baby's heart rate and headache.  Rare risks include infections, high spinal block, spinal bleeding, seizure, cardiac arrest and death. 7. AWARENESS: I understand that it is possible (but unlikely) to have explicit memory of events from the operating room while under general anesthesia. 8. ELECTROCONVULSIVE THERAPY PATIENTS: This consent serves for all treatments in a single course of therapy. 9. I understand that I must inform my anesthesiologist when I last ate and/or drank to minimize the risk of anesthesia. 10. If I am pregnant, or may pregnant, I understand that elective surgery should be postponed until after the baby is born. Anesthetics cross the placenta and may temporarily anesthetize the baby. Although fetal complications of anesthesia during pregnancy are rare, they may include birth defects, premature labor, brain damage and death. 11. I certify that I informed the anesthesiologist, to the best of my ability, about medication I take including blood thinners, anticoagulants, herbal remedies, narcotics and recreational drugs (e.g. cocaine, marijuana, PCP). Failure to inform my anesthesiologist about these medicines may increase my risk of anesthetic complications. The nature and purpose of my anesthetic management was explained to me. I had the opportunity to ask questions and the answers and information provided meet my satisfaction.   I retain the right to withdraw this consent at any time prior to the administration of said anesthetic.    ___________________________________________________           _____________________________________________________  Patient Signature                                                                                      Witness Signature                ___________________________________________________           _____________________________________________________  Date/Time                                                                                               Responsible person in case of minor/ unconscious pt /Relationship    My signature below affirms that prior to the time of the procedure, I have explained to the patient and/or his/her guardian, the risks and benefits of undergoing anesthesia, as well as any reasonable alternatives.     ___________________________________________________            _____________________________________________________  Physician Signature                            Date/Time  Patient Name: Luis M Urena     : 1990     Printed: 3/11/2022      Medical Record #: [id number]                              Page 1 of 1    ----------ANESTHESIA CONSENT----------

## (undated) NOTE — LETTER
21    RE: Luiz Russo    : 1990    To Whom It May Concern:    Our patient, Luiz Russo,      __x___Has received treatment in our office on _2021________________.        _____Has been hospitalized on the following dates _________